# Patient Record
Sex: FEMALE | Race: WHITE | NOT HISPANIC OR LATINO | Employment: FULL TIME | URBAN - METROPOLITAN AREA
[De-identification: names, ages, dates, MRNs, and addresses within clinical notes are randomized per-mention and may not be internally consistent; named-entity substitution may affect disease eponyms.]

---

## 2022-08-23 ENCOUNTER — OFFICE VISIT (OUTPATIENT)
Dept: OBSTETRICS AND GYNECOLOGY | Facility: CLINIC | Age: 27
End: 2022-08-23
Payer: COMMERCIAL

## 2022-08-23 VITALS — DIASTOLIC BLOOD PRESSURE: 78 MMHG | BODY MASS INDEX: 21.14 KG/M2 | HEIGHT: 67 IN | SYSTOLIC BLOOD PRESSURE: 120 MMHG

## 2022-08-23 DIAGNOSIS — Z97.5 IUD (INTRAUTERINE DEVICE) IN PLACE: ICD-10-CM

## 2022-08-23 DIAGNOSIS — Z01.419 WELL WOMAN EXAM WITH ROUTINE GYNECOLOGICAL EXAM: Primary | ICD-10-CM

## 2022-08-23 PROCEDURE — 58301 REMOVAL OF IUD: ICD-10-PCS | Mod: S$GLB,,, | Performed by: STUDENT IN AN ORGANIZED HEALTH CARE EDUCATION/TRAINING PROGRAM

## 2022-08-23 PROCEDURE — 99999 PR PBB SHADOW E&M-EST. PATIENT-LVL II: ICD-10-PCS | Mod: PBBFAC,,, | Performed by: STUDENT IN AN ORGANIZED HEALTH CARE EDUCATION/TRAINING PROGRAM

## 2022-08-23 PROCEDURE — 99385 PREV VISIT NEW AGE 18-39: CPT | Mod: 25,S$GLB,, | Performed by: STUDENT IN AN ORGANIZED HEALTH CARE EDUCATION/TRAINING PROGRAM

## 2022-08-23 PROCEDURE — 99385 PR PREVENTIVE VISIT,NEW,18-39: ICD-10-PCS | Mod: 25,S$GLB,, | Performed by: STUDENT IN AN ORGANIZED HEALTH CARE EDUCATION/TRAINING PROGRAM

## 2022-08-23 PROCEDURE — 58301 REMOVE INTRAUTERINE DEVICE: CPT | Mod: S$GLB,,, | Performed by: STUDENT IN AN ORGANIZED HEALTH CARE EDUCATION/TRAINING PROGRAM

## 2022-08-23 PROCEDURE — 99999 PR PBB SHADOW E&M-EST. PATIENT-LVL II: CPT | Mod: PBBFAC,,, | Performed by: STUDENT IN AN ORGANIZED HEALTH CARE EDUCATION/TRAINING PROGRAM

## 2022-08-23 PROCEDURE — 88175 CYTOPATH C/V AUTO FLUID REDO: CPT | Performed by: STUDENT IN AN ORGANIZED HEALTH CARE EDUCATION/TRAINING PROGRAM

## 2022-08-23 NOTE — PROGRESS NOTES
History & Physical  Gynecology      SUBJECTIVE:     Chief Complaint: Well Woman and IUD removal (Pt refused weights)       History of Present Illness:  Annual exam and IUD removal. Desires fertility  Menstrual History: irregular menses in high school. IUD no menses  Obstetric Hx: 0  LMP: n/a  STD/STI Hx: Denies any history of STD's  Contraception Hx: IUD  Sexually Active: one male partner  Family history: below  Social: Wears seatbelts. Exercises somteimes. Feels safe at home.   Last pap: 4 years ago,normal  HPV vaccine: utd  Covid vaccine utd  Vitamins: yes      Review of patient's allergies indicates:  No Known Allergies    Past Medical History:   Diagnosis Date    Anxiety disorder, unspecified     Chronic GERD      Past Surgical History:   Procedure Laterality Date    ESOPHAGOGASTRODUODENOSCOPY N/A 8/12/2022    Procedure: EGD (ESOPHAGOGASTRODUODENOSCOPY);  Surgeon: Naun Gaitan MD;  Location: [facility];  Service: General;  Laterality: N/A;    tempanoplasty Bilateral      OB History    No obstetric history on file.       No family history on file.  Social History     Tobacco Use    Smoking status: Never Smoker    Smokeless tobacco: Never Used   Substance Use Topics    Alcohol use: Yes     Comment: social    Drug use: Never       Current Outpatient Medications   Medication Sig    FLUoxetine 40 MG capsule Take 40 mg by mouth once daily.    lansoprazole (PREVACID) 30 MG capsule Take 30 mg by mouth once daily.     No current facility-administered medications for this visit.         Review of Systems:  Review of Systems   Constitutional: Negative for activity change, appetite change, fever and unexpected weight change.   Respiratory: Negative for cough and shortness of breath.    Cardiovascular: Negative for chest pain.   Gastrointestinal: Negative for abdominal pain, blood in stool, constipation, diarrhea, nausea and vomiting.   Genitourinary: Negative for dysmenorrhea, dyspareunia, dysuria, hematuria,  menorrhagia, pelvic pain, vaginal bleeding, vaginal discharge, vaginal pain, postcoital bleeding and vaginal odor.   Integumentary:  Negative for breast mass.   Breast: Negative for lump and mass       OBJECTIVE:     Physical Exam:  Physical Exam  Vitals reviewed.   Constitutional:       General: She is not in acute distress.     Appearance: She is well-developed. She is not diaphoretic.   HENT:      Head: Normocephalic and atraumatic.   Eyes:      General: No scleral icterus.        Right eye: No discharge.         Left eye: No discharge.      Conjunctiva/sclera: Conjunctivae normal.   Neck:      Thyroid: No thyromegaly.   Cardiovascular:      Rate and Rhythm: Normal rate.   Pulmonary:      Effort: Pulmonary effort is normal.   Chest:   Breasts: Breasts are symmetrical.      Right: No inverted nipple, mass, nipple discharge, skin change or tenderness.      Left: No inverted nipple, mass, nipple discharge, skin change or tenderness.       Abdominal:      General: There is no distension.      Palpations: Abdomen is soft.      Tenderness: There is no abdominal tenderness.   Genitourinary:     Labia:         Right: No rash, tenderness, lesion or injury.         Left: No rash, tenderness, lesion or injury.       Vagina: Normal. No signs of injury and foreign body. No vaginal discharge, erythema, tenderness or bleeding.      Cervix: No cervical motion tenderness, discharge or friability.      Uterus: Not deviated, not enlarged, not fixed and not tender.       Adnexa:         Right: No mass, tenderness or fullness.          Left: No mass, tenderness or fullness.     Musculoskeletal:         General: Normal range of motion.      Cervical back: Normal range of motion and neck supple.   Lymphadenopathy:      Cervical: No cervical adenopathy.   Skin:     General: Skin is warm and dry.      Findings: No erythema or rash.   Neurological:      Mental Status: She is alert and oriented to person, place, and time.            ASSESSMENT:       ICD-10-CM ICD-9-CM    1. Well woman exam with routine gynecological exam  Z01.419 V72.31    2. IUD (intrauterine device) in place  Z97.5 V45.51           Plan:      WWE  - Vaccines utd  - Pap collected  - Mammogram age 39  - GC/CT, affirm n/a  - Daily vitamin discussed.  - Consistent condom use discussed.   - CBE normal. Physical exam normal. VSS.  - RTC for annual or PRN.    IUD in place  - IUD removed after consent obtained  - PNV recommended  - Reviewed cycle mapping  Counseling time: 15 minutes    Lily Pineda

## 2022-08-23 NOTE — PROCEDURES
Removal of IUD    Date/Time: 8/23/2022 1:30 PM  Performed by: Lily Pineda MD  Authorized by: Lily Pineda MD     Consent obtained:  Written  Consent given by:  Patient  Procedure risks and benefits discussed: yes    Patient questions answered: yes    Patient agrees, verbalizes understanding, and wants to proceed: yes    IUD grasped by: ring forceps  Removal due to infection and inflammatory reaction: no    Other reason for removal:  Desires fertility  Removal due to mechanical complications of IUD/Nexplanon: no    Removed with no complications: yes

## 2022-08-30 LAB
FINAL PATHOLOGIC DIAGNOSIS: NORMAL
Lab: NORMAL

## 2022-09-29 ENCOUNTER — PATIENT MESSAGE (OUTPATIENT)
Dept: OBSTETRICS AND GYNECOLOGY | Facility: CLINIC | Age: 27
End: 2022-09-29
Payer: COMMERCIAL

## 2022-11-17 ENCOUNTER — OFFICE VISIT (OUTPATIENT)
Dept: INTERNAL MEDICINE | Facility: CLINIC | Age: 27
End: 2022-11-17
Payer: COMMERCIAL

## 2022-11-17 VITALS
WEIGHT: 134.94 LBS | SYSTOLIC BLOOD PRESSURE: 104 MMHG | DIASTOLIC BLOOD PRESSURE: 68 MMHG | BODY MASS INDEX: 21.18 KG/M2 | HEART RATE: 72 BPM | HEIGHT: 67 IN

## 2022-11-17 DIAGNOSIS — K21.9 CHRONIC GERD: Primary | ICD-10-CM

## 2022-11-17 DIAGNOSIS — F50.9 EATING DISORDER, UNSPECIFIED TYPE: ICD-10-CM

## 2022-11-17 DIAGNOSIS — F41.9 ANXIETY: ICD-10-CM

## 2022-11-17 PROCEDURE — 99999 PR PBB SHADOW E&M-EST. PATIENT-LVL III: ICD-10-PCS | Mod: PBBFAC,,,

## 2022-11-17 PROCEDURE — 99999 PR PBB SHADOW E&M-EST. PATIENT-LVL III: CPT | Mod: PBBFAC,,,

## 2022-11-17 PROCEDURE — 99202 OFFICE O/P NEW SF 15 MIN: CPT | Mod: S$GLB,,,

## 2022-11-17 PROCEDURE — 99202 PR OFFICE/OUTPT VISIT, NEW, LEVL II, 15-29 MIN: ICD-10-PCS | Mod: S$GLB,,,

## 2022-11-17 RX ORDER — FLUOXETINE HYDROCHLORIDE 40 MG/1
40 CAPSULE ORAL DAILY
Qty: 30 CAPSULE | Refills: 11 | Status: SHIPPED | OUTPATIENT
Start: 2022-11-17 | End: 2023-10-20 | Stop reason: SDUPTHER

## 2022-11-17 RX ORDER — LANSOPRAZOLE 30 MG/1
30 CAPSULE, DELAYED RELEASE ORAL DAILY
Qty: 30 CAPSULE | Refills: 11 | Status: SHIPPED | OUTPATIENT
Start: 2022-11-17 | End: 2023-09-13 | Stop reason: SDUPTHER

## 2022-11-17 NOTE — PROGRESS NOTES
Internal Medicine Clinic Note    Subjective     Chief Complaint:  refill of medications    History of Present Illness:  Ms. Opal Killian is a 27 y.o. female with a history of anxiety/eating disorder disorder and chronic GERD who presents to clinic for refill of medications. She has no complaints today. 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     Has had chronic GERD since the age of 7 and has been taking lasoprazole for several years. She notes she does get some reflux symptoms with spicy foods or with drinking alcohol, but it has been under control. She had an EGD completed in August 2022 to screen for Sims's esophagus. Results showed normal esophagus with multiple gastric polyps. Polyp biopsy showed no evidence of dysplasia or malignancy. Negative for H. Pylori.       For her anxiety/eating disorder she follows with dietician - does blind weights with them. Sees them monthly. No issues with relapses and has been eating well.     She works as a surgery resident and would like to establish care with a formal annual exam later next year. We will defer health maintenance updates until that time.    Review of Systems   Constitutional:  Negative for chills, fever, malaise/fatigue and weight loss.   HENT:  Negative for congestion and sore throat.    Eyes:  Negative for blurred vision and pain.   Respiratory:  Negative for cough and shortness of breath.    Cardiovascular:  Negative for chest pain and palpitations.   Gastrointestinal:  Negative for abdominal pain, diarrhea, nausea and vomiting.   Genitourinary:  Negative for dysuria and hematuria.   Musculoskeletal:  Negative for falls, joint pain and myalgias.   Skin:  Negative for rash.   Neurological:  Negative for dizziness, weakness and headaches.   Psychiatric/Behavioral:  The patient is not nervous/anxious.      PAST HISTORY:     Past Medical History:   Diagnosis Date    Anxiety disorder, unspecified  "    Chronic GERD        Past Surgical History:   Procedure Laterality Date    ESOPHAGOGASTRODUODENOSCOPY N/A 8/12/2022    Procedure: EGD (ESOPHAGOGASTRODUODENOSCOPY);  Surgeon: Naun Gaitan MD;  Location: [facility];  Service: General;  Laterality: N/A;    tempanoplasty Bilateral        No family history on file.    Social History     Socioeconomic History    Marital status:    Tobacco Use    Smoking status: Never    Smokeless tobacco: Never   Substance and Sexual Activity    Alcohol use: Yes     Comment: social    Drug use: Never       MEDICATIONS & ALLERGIES:     Current Outpatient Medications on File Prior to Visit   Medication Sig    [DISCONTINUED] FLUoxetine 40 MG capsule Take 40 mg by mouth once daily.    [DISCONTINUED] lansoprazole (PREVACID) 30 MG capsule Take 30 mg by mouth once daily.     No current facility-administered medications on file prior to visit.       Review of patient's allergies indicates:  No Known Allergies    OBJECTIVE:     Vital Signs:  Vitals:    11/17/22 0807   BP: 104/68   BP Location: Left arm   Patient Position: Sitting   Pulse: 72       Height: 5' 7" (1.702 m)           Physical Exam  Vitals reviewed.   Constitutional:       General: She is not in acute distress.     Appearance: Normal appearance. She is not ill-appearing.   HENT:      Head: Normocephalic and atraumatic.   Cardiovascular:      Rate and Rhythm: Normal rate and regular rhythm.   Pulmonary:      Effort: Pulmonary effort is normal. No respiratory distress.      Breath sounds: Normal breath sounds.   Musculoskeletal:         General: Normal range of motion.   Skin:     General: Skin is warm and dry.   Neurological:      Mental Status: She is alert and oriented to person, place, and time. Mental status is at baseline.   Psychiatric:         Mood and Affect: Mood normal.         Behavior: Behavior normal.       Laboratory  No results found for: WBC, HGB, HCT, MCV, PLT  BMP  No results found for: NA, K, CL, CO2, BUN, " CREATININE, CALCIUM, ANIONGAP, EGFRNORACEVR  No results found for: INR, PROTIME  No results found for: HGBA1C    Diagnostic Results:  Labs: Reviewed  Upper GI: Reviewed      Health Maintenance Due   Topic Date Due    Hepatitis C Screening  Never done    Lipid Panel  Never done    HIV Screening  Never done    TETANUS VACCINE  Never done    COVID-19 Vaccine (3 - Booster for Moderna series) 04/03/2021    Influenza Vaccine (1) 09/01/2022         ASSESSMENT & PLAN:   Ms. Opal Killian is a 27 y.o. female with a history of chronic GERD and anxiety/eating disorder who presents to clinic for refill of longstanding medications. She has no concerns for worsening reflux symptoms and most recent EGD was normal. Her anxiety and eating disorder symptoms are under control and she has been following with a dietician monthly. She would like to defer annual labs/establishing care until a later time.        Chronic GERD  -     lansoprazole (PREVACID) 30 MG capsule; Take 1 capsule (30 mg total) by mouth once daily.  Dispense: 30 capsule; Refill: 11    Eating disorder, unspecified type  -     FLUoxetine 40 MG capsule; Take 1 capsule (40 mg total) by mouth once daily.  Dispense: 30 capsule; Refill: 11    Anxiety  -     FLUoxetine 40 MG capsule; Take 1 capsule (40 mg total) by mouth once daily.  Dispense: 30 capsule; Refill: 11      RTC in PRN    Discussed with Dr. Cadena  - staff attestation to follow        Taya Ayers MD, MPH  Internal Medicine, PGY-2  [facility]  [address] [place] [id number] [phone]

## 2022-11-17 NOTE — PROGRESS NOTES
Patient's history and physical exam discussed. Please refer to resident physician's note for specific details. I agree with resident's assessment and plan.     Flaco Cadena MD  Department of Internal Medicine  [facility]

## 2022-11-30 RX ORDER — NITROFURANTOIN 25; 75 MG/1; MG/1
100 CAPSULE ORAL 2 TIMES DAILY
Qty: 5 CAPSULE | Refills: 0 | Status: SHIPPED | OUTPATIENT
Start: 2022-11-30 | End: 2023-04-25

## 2023-02-03 RX ORDER — ONDANSETRON 4 MG/1
4 TABLET, ORALLY DISINTEGRATING ORAL 2 TIMES DAILY
Qty: 10 TABLET | Refills: 0 | Status: SHIPPED | OUTPATIENT
Start: 2023-02-03 | End: 2023-04-25

## 2023-02-08 ENCOUNTER — TELEPHONE (OUTPATIENT)
Dept: OBSTETRICS AND GYNECOLOGY | Facility: CLINIC | Age: 28
End: 2023-02-08
Payer: COMMERCIAL

## 2023-02-08 NOTE — TELEPHONE ENCOUNTER
----- Message from Amada Schulz MA sent at 2/7/2023  4:43 PM CST -----  Regarding: FW: Patient Advice    ----- Message -----  From: Cordelia Law  Sent: 2/7/2023   2:54 PM CST  To: [facility] Staff  Subject: Patient Advice                                                 Name of Who is Calling:  Opal Killian    Who Left The Message:  Opal Killian      What is the request in detail:       Patient called requesting a call regarding it has been at least 3 month and she still has not had a menstrual cycle. Patient also states she is not pregnant.  Please further advise.   Thank you      Reply by [facility]:  YES      Preferred Call Back :  [phone] (M)

## 2023-02-14 ENCOUNTER — PATIENT MESSAGE (OUTPATIENT)
Dept: OBSTETRICS AND GYNECOLOGY | Facility: CLINIC | Age: 28
End: 2023-02-14
Payer: COMMERCIAL

## 2023-02-14 DIAGNOSIS — N91.5 OLIGOMENORRHEA, UNSPECIFIED TYPE: Primary | ICD-10-CM

## 2023-02-15 ENCOUNTER — LAB VISIT (OUTPATIENT)
Dept: LAB | Facility: HOSPITAL | Age: 28
End: 2023-02-15
Attending: STUDENT IN AN ORGANIZED HEALTH CARE EDUCATION/TRAINING PROGRAM
Payer: COMMERCIAL

## 2023-02-15 DIAGNOSIS — N91.5 OLIGOMENORRHEA, UNSPECIFIED TYPE: ICD-10-CM

## 2023-02-15 LAB
ESTRADIOL SERPL-MCNC: 55 PG/ML
FSH SERPL-ACNC: 6.93 MIU/ML
HCG INTACT+B SERPL-ACNC: <2.4 MIU/ML
LH SERPL-ACNC: 9.6 MIU/ML
PROLACTIN SERPL IA-MCNC: 7.7 NG/ML (ref 5.2–26.5)
TSH SERPL DL<=0.005 MIU/L-ACNC: 2.7 UIU/ML (ref 0.4–4)

## 2023-02-15 PROCEDURE — 84146 ASSAY OF PROLACTIN: CPT | Performed by: STUDENT IN AN ORGANIZED HEALTH CARE EDUCATION/TRAINING PROGRAM

## 2023-02-15 PROCEDURE — 83001 ASSAY OF GONADOTROPIN (FSH): CPT | Performed by: STUDENT IN AN ORGANIZED HEALTH CARE EDUCATION/TRAINING PROGRAM

## 2023-02-15 PROCEDURE — 83520 IMMUNOASSAY QUANT NOS NONAB: CPT | Performed by: STUDENT IN AN ORGANIZED HEALTH CARE EDUCATION/TRAINING PROGRAM

## 2023-02-15 PROCEDURE — 84702 CHORIONIC GONADOTROPIN TEST: CPT | Performed by: STUDENT IN AN ORGANIZED HEALTH CARE EDUCATION/TRAINING PROGRAM

## 2023-02-15 PROCEDURE — 82670 ASSAY OF TOTAL ESTRADIOL: CPT | Performed by: STUDENT IN AN ORGANIZED HEALTH CARE EDUCATION/TRAINING PROGRAM

## 2023-02-15 PROCEDURE — 83002 ASSAY OF GONADOTROPIN (LH): CPT | Performed by: STUDENT IN AN ORGANIZED HEALTH CARE EDUCATION/TRAINING PROGRAM

## 2023-02-15 PROCEDURE — 84443 ASSAY THYROID STIM HORMONE: CPT | Performed by: STUDENT IN AN ORGANIZED HEALTH CARE EDUCATION/TRAINING PROGRAM

## 2023-02-16 ENCOUNTER — PATIENT MESSAGE (OUTPATIENT)
Dept: OBSTETRICS AND GYNECOLOGY | Facility: CLINIC | Age: 28
End: 2023-02-16
Payer: COMMERCIAL

## 2023-02-16 LAB — MIS SERPL-MCNC: 7 NG/ML (ref 0.89–9.9)

## 2023-02-20 ENCOUNTER — PATIENT MESSAGE (OUTPATIENT)
Dept: OBSTETRICS AND GYNECOLOGY | Facility: CLINIC | Age: 28
End: 2023-02-20
Payer: COMMERCIAL

## 2023-03-23 ENCOUNTER — CLINICAL SUPPORT (OUTPATIENT)
Dept: OBSTETRICS AND GYNECOLOGY | Facility: CLINIC | Age: 28
End: 2023-03-23
Payer: COMMERCIAL

## 2023-03-23 DIAGNOSIS — N91.2 AMENORRHEA: Primary | ICD-10-CM

## 2023-03-28 ENCOUNTER — HOSPITAL ENCOUNTER (OUTPATIENT)
Dept: PERINATAL CARE | Facility: OTHER | Age: 28
Discharge: HOME | End: 2023-03-28
Attending: STUDENT IN AN ORGANIZED HEALTH CARE EDUCATION/TRAINING PROGRAM
Payer: COMMERCIAL

## 2023-03-28 ENCOUNTER — OFFICE VISIT (OUTPATIENT)
Dept: OBSTETRICS AND GYNECOLOGY | Facility: CLINIC | Age: 28
End: 2023-03-28
Payer: COMMERCIAL

## 2023-03-28 VITALS
BODY MASS INDEX: 20.97 KG/M2 | SYSTOLIC BLOOD PRESSURE: 104 MMHG | DIASTOLIC BLOOD PRESSURE: 68 MMHG | HEIGHT: 67 IN | WEIGHT: 133.63 LBS

## 2023-03-28 DIAGNOSIS — Z34.90 PREGNANCY, UNSPECIFIED GESTATIONAL AGE: Primary | ICD-10-CM

## 2023-03-28 DIAGNOSIS — N91.2 AMENORRHEA: ICD-10-CM

## 2023-03-28 PROCEDURE — 87086 URINE CULTURE/COLONY COUNT: CPT | Performed by: ADVANCED PRACTICE MIDWIFE

## 2023-03-28 PROCEDURE — 76801 OB US < 14 WKS SINGLE FETUS: CPT | Mod: 26,,, | Performed by: OBSTETRICS & GYNECOLOGY

## 2023-03-28 PROCEDURE — 99999 PR PBB SHADOW E&M-EST. PATIENT-LVL III: CPT | Mod: PBBFAC,,, | Performed by: ADVANCED PRACTICE MIDWIFE

## 2023-03-28 PROCEDURE — 87591 N.GONORRHOEAE DNA AMP PROB: CPT | Performed by: ADVANCED PRACTICE MIDWIFE

## 2023-03-28 PROCEDURE — 99213 OFFICE O/P EST LOW 20 MIN: CPT | Mod: S$GLB,,, | Performed by: ADVANCED PRACTICE MIDWIFE

## 2023-03-28 PROCEDURE — 76801 US OB/GYN PROCEDURE (VIEWPOINT): ICD-10-PCS | Mod: 26,,, | Performed by: OBSTETRICS & GYNECOLOGY

## 2023-03-28 PROCEDURE — 99999 PR PBB SHADOW E&M-EST. PATIENT-LVL III: ICD-10-PCS | Mod: PBBFAC,,, | Performed by: ADVANCED PRACTICE MIDWIFE

## 2023-03-28 PROCEDURE — 99213 PR OFFICE/OUTPT VISIT, EST, LEVL III, 20-29 MIN: ICD-10-PCS | Mod: S$GLB,,, | Performed by: ADVANCED PRACTICE MIDWIFE

## 2023-03-28 PROCEDURE — 76801 OB US < 14 WKS SINGLE FETUS: CPT

## 2023-03-28 NOTE — PROGRESS NOTES
HISTORY OF PRESENT ILLNESS:    Opal Killian is a 28 y.o. female, ,  Patient's last menstrual period was 2023.  for a routine exam complaining of amenorrhea.    Pt reports nausea.     Past Medical History:   Diagnosis Date    Anxiety disorder, unspecified     Chronic GERD        Past Surgical History:   Procedure Laterality Date    ESOPHAGOGASTRODUODENOSCOPY N/A 2022    Procedure: EGD (ESOPHAGOGASTRODUODENOSCOPY);  Surgeon: Naun Gaitan MD;  [facility];  Service: General;  Laterality: N/A;    tempanoplasty Bilateral        MEDICATIONS AND ALLERGIES:      Current Outpatient Medications:     FLUoxetine 40 MG capsule, Take 1 capsule (40 mg total) by mouth once daily., Disp: 30 capsule, Rfl: 11    lansoprazole (PREVACID) 30 MG capsule, Take 1 capsule (30 mg total) by mouth once daily., Disp: 30 capsule, Rfl: 11    prenatal vit no.124/iron/folic (PRENATAL VITAMIN ORAL), , Disp: , Rfl:     nitrofurantoin, macrocrystal-monohydrate, (MACROBID) 100 MG capsule, Take 1 capsule (100 mg total) by mouth 2 (two) times daily., Disp: 5 capsule, Rfl: 0    ondansetron (ZOFRAN-ODT) 4 MG TbDL, Dissolve 1 tablet (4 mg total) on the tongue 2 (two) times daily., Disp: 10 tablet, Rfl: 0    Review of patient's allergies indicates:  No Known Allergies    History reviewed. No pertinent family history.    Social History     Socioeconomic History    Marital status:    Tobacco Use    Smoking status: Never    Smokeless tobacco: Never   Substance and Sexual Activity    Alcohol use: Not Currently     Comment: social    Drug use: Never    Sexual activity: Yes     Partners: Male     Birth control/protection: None       COMPREHENSIVE GYN HISTORY:  PAP History: Denies abnormal Paps.  Infection History: Denies STDs. Denies PID.  Benign History: Denies uterine fibroids. Denies ovarian cysts. Denies endometriosis. Denies other conditions.  Cancer History: Denies cervical cancer. Denies uterine cancer or  "hyperplasia. Denies ovarian cancer. Denies vulvar cancer or pre-cancer. Denies vaginal cancer or pre-cancer. Denies breast cancer. Denies colon cancer.  Sexual Activity History: Reports currently being sexually active  Menstrual History: None.  Contraception: None    ROS:  GENERAL: No weight changes. No swelling. No fatigue. No fever.  CARDIOVASCULAR: No chest pain. No shortness of breath. No leg cramps.   NEUROLOGICAL: No headaches. No vision changes.  BREASTS: No pain. No lumps. No discharge.  ABDOMEN: No pain. No diarrhea. No constipation.  REPRODUCTIVE: No abnormal bleeding.   VULVA: No pain. No lesions. No itching.  VAGINA: No relaxation. No itching. No odor. No discharge. No lesions.  URINARY: No incontinence. No nocturia. No frequency. No dysuria.    /68   Ht 5' 7" (1.702 m)   Wt 60.6 kg (133 lb 9.6 oz)   LMP 01/24/2023   BMI 20.92 kg/m²     PE:  AFFECT: Alert and oriented X 3. Interactive during exam  GENERAL: Appearance well-nourished, well-developed, in no acute distress.  HEENT: WNL  TEETH: Good dentition.  LUNGS: Easy and unlabored  HEART: Regular rate and rhythm   SKIN: No lesions or rashes.      PROCEDURES:  UPT Positive          DIAGNOSIS:  Gyn exam  IUP with stated LMP of 01/24/23, dating scan today with EGS 6wk2d, TERA 11/19/23    PLAN:Routine prenatal care    MEDICATIONS PRESCRIBED:   Currently taking OTC PNV    LABS AND TESTS ORDERED:  New Ob Labs      NEW PREGNANCY COUNSELING  Patient was counseled today on:  - Routine prenatal blood tests including HIV and anticipated course of prenatal care  - Prenatal vitamins and folic acid  - Weight gain, nutrition, and exercise  - Seafood and mercury  - Properly heating deli and prepared meats and avoiding unrefrigerated deli  meats, cheeses, and milk products,   - Avoiding cat litter and raw meats due to risk of Toxoplasmosis precautions   - Accuracy of the LMP-based TERA and the value of an early TV-u/s  - Aneuploidy and neural tube screening -- " cffDNA, sequential screening, and AFP screen at 15 weeks  - OTC medication in the first trimester  - Harmful effects of smoking, etOH, and recreational drugs  - M u/s  at 18-20 weeks.  - Common complaints of pregnancy  - Seat belt use  - Childbirth classes and hospital facilities  - All questions were answered    TERATOLOGY COUNSELING:   Discussed indications and options for aneuploidy screening - pamphlets given    -  Pt desires  and will discuss with Dr. Pineda at initial ob visit    -  Discussed Unisom and B6 for nausea    - Pain and bleeding precautions given    - Return to clinic in 4 weeks    Sarita Herrmann CNM    OB/GYN    Total time of 20 minutes, including face-to-face time and non-face-to-face time preparing to see the patient (eg, review of tests), obtaining and/or reviewing separately obtained history, documenting clinical information in the electronic or other health record, independently interpreting results, communicating results to the patient/family/caregiver, or care coordination.

## 2023-03-29 LAB — BACTERIA UR CULT: NO GROWTH

## 2023-03-30 LAB
C TRACH DNA SPEC QL NAA+PROBE: NOT DETECTED
N GONORRHOEA DNA SPEC QL NAA+PROBE: NOT DETECTED

## 2023-04-25 ENCOUNTER — INITIAL PRENATAL (OUTPATIENT)
Dept: OBSTETRICS AND GYNECOLOGY | Facility: CLINIC | Age: 28
End: 2023-04-25
Payer: COMMERCIAL

## 2023-04-25 ENCOUNTER — PATIENT MESSAGE (OUTPATIENT)
Dept: ADMINISTRATIVE | Facility: OTHER | Age: 28
End: 2023-04-25
Payer: COMMERCIAL

## 2023-04-25 VITALS — DIASTOLIC BLOOD PRESSURE: 64 MMHG | WEIGHT: 134.5 LBS | BODY MASS INDEX: 21.06 KG/M2 | SYSTOLIC BLOOD PRESSURE: 118 MMHG

## 2023-04-25 DIAGNOSIS — Z34.91 ENCOUNTER FOR SUPERVISION OF NORMAL PREGNANCY IN FIRST TRIMESTER, UNSPECIFIED GRAVIDITY: Primary | ICD-10-CM

## 2023-04-25 DIAGNOSIS — O26.899 RH NEGATIVE STATE IN ANTEPARTUM PERIOD: ICD-10-CM

## 2023-04-25 DIAGNOSIS — Z67.91 RH NEGATIVE STATE IN ANTEPARTUM PERIOD: ICD-10-CM

## 2023-04-25 PROCEDURE — 0500F INITIAL PRENATAL CARE VISIT: CPT | Mod: S$GLB,,, | Performed by: STUDENT IN AN ORGANIZED HEALTH CARE EDUCATION/TRAINING PROGRAM

## 2023-04-25 PROCEDURE — 0500F PR INITIAL PRENATAL CARE VISIT: ICD-10-PCS | Mod: S$GLB,,, | Performed by: STUDENT IN AN ORGANIZED HEALTH CARE EDUCATION/TRAINING PROGRAM

## 2023-04-25 PROCEDURE — 99999 PR PBB SHADOW E&M-EST. PATIENT-LVL II: CPT | Mod: PBBFAC,,, | Performed by: STUDENT IN AN ORGANIZED HEALTH CARE EDUCATION/TRAINING PROGRAM

## 2023-04-25 PROCEDURE — 99999 PR PBB SHADOW E&M-EST. PATIENT-LVL II: ICD-10-PCS | Mod: PBBFAC,,, | Performed by: STUDENT IN AN ORGANIZED HEALTH CARE EDUCATION/TRAINING PROGRAM

## 2023-04-25 NOTE — PATIENT INSTRUCTIONS
[email or web address]    Nausea and vomiting  Vitamin B6 (pyridoxine) 10-25mg orally with Doxylamine (unisom) 12.5mg orally every 6-8 hours as needed for nausea. If the fatigue is too bad, you can try just the B6. Lastly, if taking it as needed is not helping, consider taking the two together on a scheduled basis.    Medications  Avoid NSAIDs (aleve, motrin, ibuprofen)  Use Tylenol or Acetaminophen for aches/pains, headaches  Heating pad  GasX or simethicone for gas pains  Colace for constipation  Medications that are pregnancy category A, B or C are accepted as safe during pregnancy    Genetic Testing  LehwrypM56: best test we have, most sensitive. Tells gender. Call Toll Free to get survey to decrease cost [phone]. website has a video about the test [employer]/videos  Sequential Screen: second best test includes ultrasound around 11 weeks and blood tests at 11 and 15 weeks  Quad screen: least sensitive for chromosomal issues, but would tell us about neural tube defects (such as spina bifida), blood work after 15 weeks   (Sequential or Quad do not tell sex of baby)    Caffiene  Less than 200mg per day    Exercise  Listen to your body  Don't stay with heart rate >140bpm    Weight Gain  -- Recommended weight gain of:          Underweight        Less than 18.5            28-40            Normal Weight     18.5-24.9                    25-35            Overweight          25-29.9                       15-25            Obese                  30 and greater             11-20      Covid  [email or web address]://[email or web address]/womens-health/faqs/[email or web address]-covid-19-[email or web address]-breastfeeding    https://www.[facility].org/womens-health/experts-and-stories/the-latest/dnz-qg-ug-know-the-covid-19-vaccines-are-safe-and-effective-one-expert-explains    https://[email or web address]/coronavirus/covid-19-visitor-policy        Dear Opal Killian,    Congratulations! Your team here at [facility] is excited for the  upcoming addition to your family and is ready to support you over the course of your pregnancy. One of the ways we are prepared to help you is through our Connected MOM program.     Connected MOM is offered at no charge to help you manage your pregnancy by staying connected with your OB team through digitally connected devices. With Connected MOM, you will be able to digitally send weights and blood pressure readings to your provider and their team from the comfort of work or home without needing to schedule an appointment. Patients who participate in Connected MOM may be able to reduce the number of in-office appointments needed.     To participate, click here to complete the Connected Mom Program Consent questionnaire to start the enrollment process.    Once youve completed the questionnaire, you will be able to select how youd like to obtain your Connected Mom equipment - a digital blood pressure cuff and scale. These can be shipped directly to your home or picked up at an [facility].       If you have any questions about the program, please contact your OB provider or the Connected MOM support team at [phone].    Thank you,  The Connected MOM Team

## 2023-04-25 NOTE — PROGRESS NOTES
Pt is here for initial OB. Feeling well today  Feeling fatigued, nauseated. B6 unsure if helping. Also has breast tenderness. No bleeding or pain.  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 Safe at home. Cat, does not change litter box.   Taking PNV   PMH: pantoprazole and fluoxetine, allergy  Prior pregnancies none  PSH: no abdominal  Desires Mirena for contraception  Wants to breastfeed  No family history of genetic disorders  No personal or family history of DM  No tobacco, EtOH or illicit drug use  Pap utd  No H/o HSV  Covid vaccinated           -Reviewed routine PNC  -Reviewed newmom, connected mom  -Reviewed initial labs, ultrasound  -Reviewed common pregnancy complaints and comfort measures for them  -Genetic testing : will get back to me on her decision  -RTC 4 weeks    Lily Pineda M.D.

## 2023-05-03 ENCOUNTER — PATIENT MESSAGE (OUTPATIENT)
Dept: OBSTETRICS AND GYNECOLOGY | Facility: CLINIC | Age: 28
End: 2023-05-03
Payer: COMMERCIAL

## 2023-05-08 ENCOUNTER — PATIENT MESSAGE (OUTPATIENT)
Dept: OBSTETRICS AND GYNECOLOGY | Facility: CLINIC | Age: 28
End: 2023-05-08
Payer: COMMERCIAL

## 2023-05-10 ENCOUNTER — PATIENT MESSAGE (OUTPATIENT)
Dept: OBSTETRICS AND GYNECOLOGY | Facility: CLINIC | Age: 28
End: 2023-05-10
Payer: COMMERCIAL

## 2023-05-11 ENCOUNTER — PATIENT MESSAGE (OUTPATIENT)
Dept: OBSTETRICS AND GYNECOLOGY | Facility: CLINIC | Age: 28
End: 2023-05-11
Payer: COMMERCIAL

## 2023-05-11 RX ORDER — ONDANSETRON 4 MG/1
8 TABLET, ORALLY DISINTEGRATING ORAL 2 TIMES DAILY
Qty: 30 TABLET | Refills: 1 | Status: ON HOLD | OUTPATIENT
Start: 2023-05-11 | End: 2023-11-14 | Stop reason: HOSPADM

## 2023-05-16 ENCOUNTER — PATIENT MESSAGE (OUTPATIENT)
Dept: OBSTETRICS AND GYNECOLOGY | Facility: CLINIC | Age: 28
End: 2023-05-16
Payer: COMMERCIAL

## 2023-05-29 ENCOUNTER — TELEPHONE (OUTPATIENT)
Dept: OBSTETRICS AND GYNECOLOGY | Facility: CLINIC | Age: 28
End: 2023-05-29
Payer: COMMERCIAL

## 2023-05-31 ENCOUNTER — TELEPHONE (OUTPATIENT)
Dept: OBSTETRICS AND GYNECOLOGY | Facility: CLINIC | Age: 28
End: 2023-05-31
Payer: COMMERCIAL

## 2023-05-31 NOTE — TELEPHONE ENCOUNTER
Pt returned call and she stated that she is not having any other symptoms with her headaches and that her blood pressure has been fine. Let her know that pre provider she is able to take a daily supplement for magnesium oxide 400mg. Also let patient know that is she has any other changes to go on let us know.

## 2023-05-31 NOTE — TELEPHONE ENCOUNTER
----- Message from Erika Underwood sent at 5/31/2023  8:15 AM CDT -----  Pt called in to speak with the provider in regards to an ongoing headache she has been suffering with for 3 days. The pt states she has tried tylenol and caffeine and nothing is working. Pls call and advise.

## 2023-06-04 ENCOUNTER — PATIENT MESSAGE (OUTPATIENT)
Dept: OTHER | Facility: OTHER | Age: 28
End: 2023-06-04
Payer: COMMERCIAL

## 2023-06-07 ENCOUNTER — ROUTINE PRENATAL (OUTPATIENT)
Dept: OBSTETRICS AND GYNECOLOGY | Facility: CLINIC | Age: 28
End: 2023-06-07
Payer: COMMERCIAL

## 2023-06-07 ENCOUNTER — LAB VISIT (OUTPATIENT)
Dept: LAB | Facility: OTHER | Age: 28
End: 2023-06-07
Attending: NURSE PRACTITIONER
Payer: COMMERCIAL

## 2023-06-07 VITALS
WEIGHT: 136.69 LBS | DIASTOLIC BLOOD PRESSURE: 65 MMHG | BODY MASS INDEX: 21.41 KG/M2 | SYSTOLIC BLOOD PRESSURE: 113 MMHG

## 2023-06-07 DIAGNOSIS — Z3A.16 16 WEEKS GESTATION OF PREGNANCY: Primary | ICD-10-CM

## 2023-06-07 DIAGNOSIS — Z3A.16 16 WEEKS GESTATION OF PREGNANCY: ICD-10-CM

## 2023-06-07 PROCEDURE — 99999 PR PBB SHADOW E&M-EST. PATIENT-LVL III: CPT | Mod: PBBFAC,,, | Performed by: NURSE PRACTITIONER

## 2023-06-07 PROCEDURE — 99999 PR PBB SHADOW E&M-EST. PATIENT-LVL III: ICD-10-PCS | Mod: PBBFAC,,, | Performed by: NURSE PRACTITIONER

## 2023-06-07 PROCEDURE — 82105 ALPHA-FETOPROTEIN SERUM: CPT | Performed by: NURSE PRACTITIONER

## 2023-06-07 PROCEDURE — 0502F SUBSEQUENT PRENATAL CARE: CPT | Mod: S$GLB,,, | Performed by: NURSE PRACTITIONER

## 2023-06-07 PROCEDURE — 0502F PR SUBSEQUENT PRENATAL CARE: ICD-10-PCS | Mod: S$GLB,,, | Performed by: NURSE PRACTITIONER

## 2023-06-07 PROCEDURE — 36415 COLL VENOUS BLD VENIPUNCTURE: CPT | Performed by: NURSE PRACTITIONER

## 2023-06-07 RX ORDER — BUTALBITAL, ACETAMINOPHEN AND CAFFEINE 50; 325; 40 MG/1; MG/1; MG/1
1 TABLET ORAL EVERY 12 HOURS PRN
Qty: 20 TABLET | Refills: 0 | Status: SHIPPED | OUTPATIENT
Start: 2023-06-07 | End: 2023-07-07

## 2023-06-07 NOTE — PROGRESS NOTES
Here for routine OB appt at 16w3d, with no complaints. No flutters yet. Getting some headaches, tylenol and caffeine do not help. Did mag oxide for a few days. Resident at main campus, works long hours. Baby is going to [facility] .  Denies VB and cramping.  Pain, bleeding, and PTL precautions given.  F/U scheduled 8 weeks- skips 20 week visit  Anatomy scan ordered  [facility] MOM uploads reviewed  Rhogam at 28 weeks with glucose  AFP marker today  Rx Fioricet

## 2023-06-07 NOTE — PATIENT INSTRUCTIONS
LABOR AND DELIVERY PHONE NUMBER, [phone] (OPEN 24/7, LOCATED ON 6TH FLOOR OF HOSPITAL)  [address] PHONE NUMBER, [phone] (OPEN MON-FRI, 8a-5p)     Start Magnesium sulfate 500 mg daily as ppx for headaches. Increase to 1000 mg daily as needed / tolerated;              - Start Riboflavin 400 mg daily and CoQ10 200 mg daily as headaches ppx as well;              - She can use Fioricet safely as rescue medication so long as she restricts its use to 2 tablets per day and not more than 4 per week. We discussed that this restriction will help to prevent analgesics overuse headache syndrome and will also be low enough of a dose that her fetus will not develop dependence.

## 2023-06-09 ENCOUNTER — PATIENT MESSAGE (OUTPATIENT)
Dept: MATERNAL FETAL MEDICINE | Facility: CLINIC | Age: 28
End: 2023-06-09
Payer: COMMERCIAL

## 2023-06-11 ENCOUNTER — PATIENT MESSAGE (OUTPATIENT)
Dept: OTHER | Facility: OTHER | Age: 28
End: 2023-06-11
Payer: COMMERCIAL

## 2023-06-12 LAB
# FETUSES US: NORMAL
AFP INTERPRETATION: NORMAL
AFP MOM SERPL: 1.43
AFP SERPL-MCNC: 55.3 NG/ML
AFP SERPL-MCNC: NEGATIVE NG/ML
AGE AT DELIVERY: 29
GA (DAYS): 3 D
GA (WEEKS): 16 WK
GESTATIONAL AGE METHOD: NORMAL
IDDM PATIENT QL: NORMAL
SMOKING STATUS FTND: NO

## 2023-07-02 ENCOUNTER — PATIENT MESSAGE (OUTPATIENT)
Dept: OTHER | Facility: OTHER | Age: 28
End: 2023-07-02
Payer: COMMERCIAL

## 2023-07-03 ENCOUNTER — PATIENT MESSAGE (OUTPATIENT)
Dept: MATERNAL FETAL MEDICINE | Facility: CLINIC | Age: 28
End: 2023-07-03
Payer: COMMERCIAL

## 2023-07-05 ENCOUNTER — PROCEDURE VISIT (OUTPATIENT)
Dept: MATERNAL FETAL MEDICINE | Facility: CLINIC | Age: 28
End: 2023-07-05
Payer: COMMERCIAL

## 2023-07-05 DIAGNOSIS — Z3A.16 16 WEEKS GESTATION OF PREGNANCY: ICD-10-CM

## 2023-07-05 DIAGNOSIS — Z36.2 ENCOUNTER FOR FOLLOW-UP ULTRASOUND OF FETAL ANATOMY: Primary | ICD-10-CM

## 2023-07-05 PROCEDURE — 76805 OB US >/= 14 WKS SNGL FETUS: CPT | Mod: S$GLB,,, | Performed by: OBSTETRICS & GYNECOLOGY

## 2023-07-05 PROCEDURE — 76805 US MFM PROCEDURE (VIEWPOINT): ICD-10-PCS | Mod: S$GLB,,, | Performed by: OBSTETRICS & GYNECOLOGY

## 2023-07-30 ENCOUNTER — PATIENT MESSAGE (OUTPATIENT)
Dept: OTHER | Facility: OTHER | Age: 28
End: 2023-07-30
Payer: COMMERCIAL

## 2023-08-01 ENCOUNTER — ROUTINE PRENATAL (OUTPATIENT)
Dept: OBSTETRICS AND GYNECOLOGY | Facility: CLINIC | Age: 28
End: 2023-08-01
Payer: COMMERCIAL

## 2023-08-01 VITALS — WEIGHT: 147.5 LBS | SYSTOLIC BLOOD PRESSURE: 106 MMHG | DIASTOLIC BLOOD PRESSURE: 54 MMHG | BODY MASS INDEX: 23.1 KG/M2

## 2023-08-01 DIAGNOSIS — Z67.91 RH NEGATIVE STATE IN ANTEPARTUM PERIOD: Primary | ICD-10-CM

## 2023-08-01 DIAGNOSIS — O26.899 RH NEGATIVE STATE IN ANTEPARTUM PERIOD: Primary | ICD-10-CM

## 2023-08-01 DIAGNOSIS — Z34.91 ENCOUNTER FOR SUPERVISION OF NORMAL PREGNANCY IN FIRST TRIMESTER, UNSPECIFIED GRAVIDITY: ICD-10-CM

## 2023-08-01 DIAGNOSIS — Z34.91 ENCOUNTER FOR SUPERVISION OF NORMAL PREGNANCY IN FIRST TRIMESTER, UNSPECIFIED GRAVIDITY: Primary | ICD-10-CM

## 2023-08-01 DIAGNOSIS — O26.899 RH NEGATIVE STATE IN ANTEPARTUM PERIOD: ICD-10-CM

## 2023-08-01 DIAGNOSIS — Z67.91 RH NEGATIVE STATE IN ANTEPARTUM PERIOD: ICD-10-CM

## 2023-08-01 PROCEDURE — 0502F SUBSEQUENT PRENATAL CARE: CPT | Mod: S$GLB,,, | Performed by: STUDENT IN AN ORGANIZED HEALTH CARE EDUCATION/TRAINING PROGRAM

## 2023-08-01 PROCEDURE — 99999 PR PBB SHADOW E&M-EST. PATIENT-LVL III: CPT | Mod: PBBFAC,,, | Performed by: STUDENT IN AN ORGANIZED HEALTH CARE EDUCATION/TRAINING PROGRAM

## 2023-08-01 PROCEDURE — 0502F PR SUBSEQUENT PRENATAL CARE: ICD-10-PCS | Mod: S$GLB,,, | Performed by: STUDENT IN AN ORGANIZED HEALTH CARE EDUCATION/TRAINING PROGRAM

## 2023-08-01 PROCEDURE — 99999 PR PBB SHADOW E&M-EST. PATIENT-LVL III: ICD-10-PCS | Mod: PBBFAC,,, | Performed by: STUDENT IN AN ORGANIZED HEALTH CARE EDUCATION/TRAINING PROGRAM

## 2023-08-01 NOTE — PROGRESS NOTES
Pt doing well. No complaints. Some concern over weight gain  Denies vaginal bleeding, LOF, contractions. Reports regular fetal movement. Denies symptoms of pre E.  VS reviewed.  Glucola recs reviewed  Tdap and rhogam recs reviewed   Pump rx   Peds list given    Labor and pre E precautions reviewed.   RTC: 4 weeks

## 2023-08-01 NOTE — PATIENT INSTRUCTIONS
[employer]    OR    Bring the paper breast pump prescription to this location:  [email or web address]  [address] [place] [address]  [phone]          Tdap or Dtap for close family if not had in the past five years      [facility], Labor and Delivery is on the 6th floor of [facility]: [phone]      [email or web address]

## 2023-08-09 ENCOUNTER — PROCEDURE VISIT (OUTPATIENT)
Dept: MATERNAL FETAL MEDICINE | Facility: CLINIC | Age: 28
End: 2023-08-09
Payer: COMMERCIAL

## 2023-08-09 DIAGNOSIS — Z36.2 ENCOUNTER FOR FOLLOW-UP ULTRASOUND OF FETAL ANATOMY: ICD-10-CM

## 2023-08-09 PROCEDURE — 76816 OB US FOLLOW-UP PER FETUS: CPT | Mod: S$GLB,,, | Performed by: OBSTETRICS & GYNECOLOGY

## 2023-08-09 PROCEDURE — 76816 US MFM PROCEDURE (VIEWPOINT): ICD-10-PCS | Mod: S$GLB,,, | Performed by: OBSTETRICS & GYNECOLOGY

## 2023-08-13 ENCOUNTER — PATIENT MESSAGE (OUTPATIENT)
Dept: OTHER | Facility: OTHER | Age: 28
End: 2023-08-13
Payer: COMMERCIAL

## 2023-08-24 ENCOUNTER — TELEPHONE (OUTPATIENT)
Dept: OBSTETRICS AND GYNECOLOGY | Facility: CLINIC | Age: 28
End: 2023-08-24
Payer: COMMERCIAL

## 2023-08-24 NOTE — TELEPHONE ENCOUNTER
----- Message from Nuris Kc sent at 8/24/2023  8:34 AM CDT -----  Contact: JOSÉ STALEY [[id number]]  Type:  Patient Returning Call      Who Called:   JOSÉ STALEY [[id number]]      Who Left Message for Patient: Unsure      Does the patient know what this is regarding?: Yes      Would the patient rather a call back or a response via [facility]?  Call back       Best Call Back Number: [phone] (home)       Additional Information:

## 2023-08-27 ENCOUNTER — PATIENT MESSAGE (OUTPATIENT)
Dept: OTHER | Facility: OTHER | Age: 28
End: 2023-08-27
Payer: COMMERCIAL

## 2023-08-29 ENCOUNTER — LAB VISIT (OUTPATIENT)
Dept: LAB | Facility: OTHER | Age: 28
End: 2023-08-29
Attending: STUDENT IN AN ORGANIZED HEALTH CARE EDUCATION/TRAINING PROGRAM
Payer: COMMERCIAL

## 2023-08-29 ENCOUNTER — ROUTINE PRENATAL (OUTPATIENT)
Dept: OBSTETRICS AND GYNECOLOGY | Facility: CLINIC | Age: 28
End: 2023-08-29
Payer: COMMERCIAL

## 2023-08-29 VITALS — SYSTOLIC BLOOD PRESSURE: 112 MMHG | BODY MASS INDEX: 23.2 KG/M2 | WEIGHT: 148.13 LBS | DIASTOLIC BLOOD PRESSURE: 60 MMHG

## 2023-08-29 DIAGNOSIS — Z34.91 ENCOUNTER FOR SUPERVISION OF NORMAL PREGNANCY IN FIRST TRIMESTER, UNSPECIFIED GRAVIDITY: Primary | ICD-10-CM

## 2023-08-29 DIAGNOSIS — Z67.91 RH NEGATIVE STATE IN ANTEPARTUM PERIOD: ICD-10-CM

## 2023-08-29 DIAGNOSIS — O26.899 RH NEGATIVE STATE IN ANTEPARTUM PERIOD: ICD-10-CM

## 2023-08-29 DIAGNOSIS — Z34.91 ENCOUNTER FOR SUPERVISION OF NORMAL PREGNANCY IN FIRST TRIMESTER, UNSPECIFIED GRAVIDITY: ICD-10-CM

## 2023-08-29 LAB
BASOPHILS # BLD AUTO: 0.06 K/UL (ref 0–0.2)
BASOPHILS # BLD AUTO: 0.06 K/UL (ref 0–0.2)
BASOPHILS NFR BLD: 0.5 % (ref 0–1.9)
BASOPHILS NFR BLD: 0.5 % (ref 0–1.9)
DIFFERENTIAL METHOD: ABNORMAL
DIFFERENTIAL METHOD: ABNORMAL
EOSINOPHIL # BLD AUTO: 0.1 K/UL (ref 0–0.5)
EOSINOPHIL # BLD AUTO: 0.1 K/UL (ref 0–0.5)
EOSINOPHIL NFR BLD: 0.9 % (ref 0–8)
EOSINOPHIL NFR BLD: 0.9 % (ref 0–8)
ERYTHROCYTE [DISTWIDTH] IN BLOOD BY AUTOMATED COUNT: 12.2 % (ref 11.5–14.5)
ERYTHROCYTE [DISTWIDTH] IN BLOOD BY AUTOMATED COUNT: 12.2 % (ref 11.5–14.5)
GLUCOSE SERPL-MCNC: 148 MG/DL (ref 70–140)
HCT VFR BLD AUTO: 35.1 % (ref 37–48.5)
HCT VFR BLD AUTO: 35.1 % (ref 37–48.5)
HGB BLD-MCNC: 11.7 G/DL (ref 12–16)
HGB BLD-MCNC: 11.7 G/DL (ref 12–16)
IMM GRANULOCYTES # BLD AUTO: 0.1 K/UL (ref 0–0.04)
IMM GRANULOCYTES # BLD AUTO: 0.1 K/UL (ref 0–0.04)
IMM GRANULOCYTES NFR BLD AUTO: 0.8 % (ref 0–0.5)
IMM GRANULOCYTES NFR BLD AUTO: 0.8 % (ref 0–0.5)
LYMPHOCYTES # BLD AUTO: 2 K/UL (ref 1–4.8)
LYMPHOCYTES # BLD AUTO: 2 K/UL (ref 1–4.8)
LYMPHOCYTES NFR BLD: 16.2 % (ref 18–48)
LYMPHOCYTES NFR BLD: 16.2 % (ref 18–48)
MCH RBC QN AUTO: 30.2 PG (ref 27–31)
MCH RBC QN AUTO: 30.2 PG (ref 27–31)
MCHC RBC AUTO-ENTMCNC: 33.3 G/DL (ref 32–36)
MCHC RBC AUTO-ENTMCNC: 33.3 G/DL (ref 32–36)
MCV RBC AUTO: 91 FL (ref 82–98)
MCV RBC AUTO: 91 FL (ref 82–98)
MONOCYTES # BLD AUTO: 0.6 K/UL (ref 0.3–1)
MONOCYTES # BLD AUTO: 0.6 K/UL (ref 0.3–1)
MONOCYTES NFR BLD: 5.1 % (ref 4–15)
MONOCYTES NFR BLD: 5.1 % (ref 4–15)
NEUTROPHILS # BLD AUTO: 9.5 K/UL (ref 1.8–7.7)
NEUTROPHILS # BLD AUTO: 9.5 K/UL (ref 1.8–7.7)
NEUTROPHILS NFR BLD: 76.5 % (ref 38–73)
NEUTROPHILS NFR BLD: 76.5 % (ref 38–73)
NRBC BLD-RTO: 0 /100 WBC
NRBC BLD-RTO: 0 /100 WBC
PLATELET # BLD AUTO: 193 K/UL (ref 150–450)
PLATELET # BLD AUTO: 193 K/UL (ref 150–450)
PMV BLD AUTO: 10.4 FL (ref 9.2–12.9)
PMV BLD AUTO: 10.4 FL (ref 9.2–12.9)
RBC # BLD AUTO: 3.87 M/UL (ref 4–5.4)
RBC # BLD AUTO: 3.87 M/UL (ref 4–5.4)
RPR SER QL: NORMAL
WBC # BLD AUTO: 12.39 K/UL (ref 3.9–12.7)
WBC # BLD AUTO: 12.39 K/UL (ref 3.9–12.7)

## 2023-08-29 PROCEDURE — 87389 HIV-1 AG W/HIV-1&-2 AB AG IA: CPT | Performed by: STUDENT IN AN ORGANIZED HEALTH CARE EDUCATION/TRAINING PROGRAM

## 2023-08-29 PROCEDURE — 85025 COMPLETE CBC W/AUTO DIFF WBC: CPT | Performed by: STUDENT IN AN ORGANIZED HEALTH CARE EDUCATION/TRAINING PROGRAM

## 2023-08-29 PROCEDURE — 86592 SYPHILIS TEST NON-TREP QUAL: CPT | Performed by: STUDENT IN AN ORGANIZED HEALTH CARE EDUCATION/TRAINING PROGRAM

## 2023-08-29 PROCEDURE — 82950 GLUCOSE TEST: CPT | Performed by: STUDENT IN AN ORGANIZED HEALTH CARE EDUCATION/TRAINING PROGRAM

## 2023-08-29 PROCEDURE — 99999 PR PBB SHADOW E&M-EST. PATIENT-LVL III: ICD-10-PCS | Mod: PBBFAC,,, | Performed by: STUDENT IN AN ORGANIZED HEALTH CARE EDUCATION/TRAINING PROGRAM

## 2023-08-29 PROCEDURE — 36415 COLL VENOUS BLD VENIPUNCTURE: CPT | Performed by: STUDENT IN AN ORGANIZED HEALTH CARE EDUCATION/TRAINING PROGRAM

## 2023-08-29 PROCEDURE — 0502F SUBSEQUENT PRENATAL CARE: CPT | Mod: S$GLB,,, | Performed by: STUDENT IN AN ORGANIZED HEALTH CARE EDUCATION/TRAINING PROGRAM

## 2023-08-29 PROCEDURE — 0502F PR SUBSEQUENT PRENATAL CARE: ICD-10-PCS | Mod: S$GLB,,, | Performed by: STUDENT IN AN ORGANIZED HEALTH CARE EDUCATION/TRAINING PROGRAM

## 2023-08-29 PROCEDURE — 99999 PR PBB SHADOW E&M-EST. PATIENT-LVL III: CPT | Mod: PBBFAC,,, | Performed by: STUDENT IN AN ORGANIZED HEALTH CARE EDUCATION/TRAINING PROGRAM

## 2023-08-29 NOTE — PROGRESS NOTES
Pt doing well. GERD. Prilosec.   Denies vaginal bleeding, LOF, contractions. Reports regular fetal movement. Denies symptoms of pre E.  VS reviewed.  Glucola underway  Tdap and rhogam recs reviewed   Pump rx reviewed  Peds [place] at [facility]  Labor and pre E precautions reviewed.   RTC: 4 weeks

## 2023-08-29 NOTE — PATIENT INSTRUCTIONS
[employer]    OR    Bring the paper breast pump prescription to this location:  [email or web address]  [address] [place] [address]  [phone]          Tdap or Dtap for close family if not had in the past five years      [facility], Labor and Delivery is on the 6th floor of [facility]: [phone]      [email or web address]

## 2023-08-30 ENCOUNTER — TELEPHONE (OUTPATIENT)
Dept: OBSTETRICS AND GYNECOLOGY | Facility: CLINIC | Age: 28
End: 2023-08-30
Payer: COMMERCIAL

## 2023-08-30 LAB — HIV 1+2 AB+HIV1 P24 AG SERPL QL IA: NORMAL

## 2023-09-08 ENCOUNTER — LAB VISIT (OUTPATIENT)
Dept: LAB | Facility: OTHER | Age: 28
End: 2023-09-08
Attending: STUDENT IN AN ORGANIZED HEALTH CARE EDUCATION/TRAINING PROGRAM
Payer: COMMERCIAL

## 2023-09-08 DIAGNOSIS — Z34.91 ENCOUNTER FOR SUPERVISION OF NORMAL PREGNANCY IN FIRST TRIMESTER, UNSPECIFIED GRAVIDITY: ICD-10-CM

## 2023-09-08 DIAGNOSIS — O26.899 RH NEGATIVE STATE IN ANTEPARTUM PERIOD: ICD-10-CM

## 2023-09-08 DIAGNOSIS — Z67.91 RH NEGATIVE STATE IN ANTEPARTUM PERIOD: ICD-10-CM

## 2023-09-08 LAB
GLUCOSE SERPL-MCNC: 115 MG/DL
GLUCOSE SERPL-MCNC: 130 MG/DL
GLUCOSE SERPL-MCNC: 81 MG/DL (ref 70–110)
GLUCOSE SERPL-MCNC: 87 MG/DL

## 2023-09-08 PROCEDURE — 82951 GLUCOSE TOLERANCE TEST (GTT): CPT | Performed by: STUDENT IN AN ORGANIZED HEALTH CARE EDUCATION/TRAINING PROGRAM

## 2023-09-08 PROCEDURE — 82952 GTT-ADDED SAMPLES: CPT | Performed by: STUDENT IN AN ORGANIZED HEALTH CARE EDUCATION/TRAINING PROGRAM

## 2023-09-08 PROCEDURE — 36415 COLL VENOUS BLD VENIPUNCTURE: CPT | Performed by: STUDENT IN AN ORGANIZED HEALTH CARE EDUCATION/TRAINING PROGRAM

## 2023-09-10 ENCOUNTER — PATIENT MESSAGE (OUTPATIENT)
Dept: OTHER | Facility: OTHER | Age: 28
End: 2023-09-10
Payer: COMMERCIAL

## 2023-09-12 ENCOUNTER — ROUTINE PRENATAL (OUTPATIENT)
Dept: OBSTETRICS AND GYNECOLOGY | Facility: CLINIC | Age: 28
End: 2023-09-12
Payer: COMMERCIAL

## 2023-09-12 ENCOUNTER — CLINICAL SUPPORT (OUTPATIENT)
Dept: OBSTETRICS AND GYNECOLOGY | Facility: CLINIC | Age: 28
End: 2023-09-12
Payer: COMMERCIAL

## 2023-09-12 VITALS — BODY MASS INDEX: 23.48 KG/M2 | DIASTOLIC BLOOD PRESSURE: 56 MMHG | WEIGHT: 149.94 LBS | SYSTOLIC BLOOD PRESSURE: 96 MMHG

## 2023-09-12 DIAGNOSIS — O26.899 RH NEGATIVE STATE IN ANTEPARTUM PERIOD: Primary | ICD-10-CM

## 2023-09-12 DIAGNOSIS — Z67.91 RH NEGATIVE STATE IN ANTEPARTUM PERIOD: Primary | ICD-10-CM

## 2023-09-12 DIAGNOSIS — Z3A.16 16 WEEKS GESTATION OF PREGNANCY: ICD-10-CM

## 2023-09-12 DIAGNOSIS — Z34.91 ENCOUNTER FOR SUPERVISION OF NORMAL PREGNANCY IN FIRST TRIMESTER, UNSPECIFIED GRAVIDITY: ICD-10-CM

## 2023-09-12 PROCEDURE — 90471 TDAP VACCINE GREATER THAN OR EQUAL TO 7YO IM: ICD-10-PCS | Mod: S$GLB,,, | Performed by: STUDENT IN AN ORGANIZED HEALTH CARE EDUCATION/TRAINING PROGRAM

## 2023-09-12 PROCEDURE — 90715 TDAP VACCINE 7 YRS/> IM: CPT | Mod: S$GLB,,, | Performed by: STUDENT IN AN ORGANIZED HEALTH CARE EDUCATION/TRAINING PROGRAM

## 2023-09-12 PROCEDURE — 99999 PR PBB SHADOW E&M-EST. PATIENT-LVL I: ICD-10-PCS | Mod: PBBFAC,,,

## 2023-09-12 PROCEDURE — 96372 THER/PROPH/DIAG INJ SC/IM: CPT | Mod: S$GLB,,, | Performed by: STUDENT IN AN ORGANIZED HEALTH CARE EDUCATION/TRAINING PROGRAM

## 2023-09-12 PROCEDURE — 99999 PR PBB SHADOW E&M-EST. PATIENT-LVL III: CPT | Mod: PBBFAC,,, | Performed by: STUDENT IN AN ORGANIZED HEALTH CARE EDUCATION/TRAINING PROGRAM

## 2023-09-12 PROCEDURE — 90715 TDAP VACCINE GREATER THAN OR EQUAL TO 7YO IM: ICD-10-PCS | Mod: S$GLB,,, | Performed by: STUDENT IN AN ORGANIZED HEALTH CARE EDUCATION/TRAINING PROGRAM

## 2023-09-12 PROCEDURE — 0502F SUBSEQUENT PRENATAL CARE: CPT | Mod: S$GLB,,, | Performed by: STUDENT IN AN ORGANIZED HEALTH CARE EDUCATION/TRAINING PROGRAM

## 2023-09-12 PROCEDURE — 96372 RHO (D) IMMUNE GLOBULIN: ICD-10-PCS | Mod: S$GLB,,, | Performed by: STUDENT IN AN ORGANIZED HEALTH CARE EDUCATION/TRAINING PROGRAM

## 2023-09-12 PROCEDURE — 0502F PR SUBSEQUENT PRENATAL CARE: ICD-10-PCS | Mod: S$GLB,,, | Performed by: STUDENT IN AN ORGANIZED HEALTH CARE EDUCATION/TRAINING PROGRAM

## 2023-09-12 PROCEDURE — 99999 PR PBB SHADOW E&M-EST. PATIENT-LVL III: ICD-10-PCS | Mod: PBBFAC,,, | Performed by: STUDENT IN AN ORGANIZED HEALTH CARE EDUCATION/TRAINING PROGRAM

## 2023-09-12 PROCEDURE — 90471 IMMUNIZATION ADMIN: CPT | Mod: S$GLB,,, | Performed by: STUDENT IN AN ORGANIZED HEALTH CARE EDUCATION/TRAINING PROGRAM

## 2023-09-12 PROCEDURE — 99999 PR PBB SHADOW E&M-EST. PATIENT-LVL I: CPT | Mod: PBBFAC,,,

## 2023-09-12 NOTE — PROGRESS NOTES
Here for Tdap injection. Patient without complaint of pain at this time, injection given. Tolerated well no pain noted post injection advised to wait in lobby 15 minutes and report any adverse reactions.      Site:LD    Here for rhogam injection, no complaints at this time verified patient is Rh negative. No complaints of pain prior to or post injection patient advised to wait 15 minutes before leaving and to report and adverse reactions.        Site:LB

## 2023-09-12 NOTE — PROGRESS NOTES
Pt doing well. No issues. Some growing pains, aches. Denies vaginal bleeding, LOF, contractions. Reports regular fetal movement. Denies symptoms of pre E.  VS reviewed.  tdap and rhogam today  Labor and pre E precautions reviewed.   RTC: 2 weeks

## 2023-09-12 NOTE — PATIENT INSTRUCTIONS
[facility], Labor and Delivery is on the 6th floor of [facility]: [phone]    [email or web address]

## 2023-09-13 DIAGNOSIS — K21.9 CHRONIC GERD: ICD-10-CM

## 2023-09-13 RX ORDER — LANSOPRAZOLE 30 MG/1
30 CAPSULE, DELAYED RELEASE ORAL DAILY
Qty: 30 CAPSULE | Refills: 11 | Status: SHIPPED | OUTPATIENT
Start: 2023-09-13 | End: 2023-10-17 | Stop reason: SDUPTHER

## 2023-09-24 ENCOUNTER — PATIENT MESSAGE (OUTPATIENT)
Dept: OTHER | Facility: OTHER | Age: 28
End: 2023-09-24
Payer: COMMERCIAL

## 2023-09-26 ENCOUNTER — ROUTINE PRENATAL (OUTPATIENT)
Dept: OBSTETRICS AND GYNECOLOGY | Facility: CLINIC | Age: 28
End: 2023-09-26
Payer: COMMERCIAL

## 2023-09-26 VITALS
SYSTOLIC BLOOD PRESSURE: 109 MMHG | BODY MASS INDEX: 23.45 KG/M2 | WEIGHT: 149.69 LBS | DIASTOLIC BLOOD PRESSURE: 63 MMHG

## 2023-09-26 DIAGNOSIS — Z3A.32 32 WEEKS GESTATION OF PREGNANCY: Primary | ICD-10-CM

## 2023-09-26 PROCEDURE — 0502F SUBSEQUENT PRENATAL CARE: CPT | Mod: S$GLB,,, | Performed by: NURSE PRACTITIONER

## 2023-09-26 PROCEDURE — 99999 PR PBB SHADOW E&M-EST. PATIENT-LVL III: CPT | Mod: PBBFAC,,, | Performed by: NURSE PRACTITIONER

## 2023-09-26 PROCEDURE — 99999 PR PBB SHADOW E&M-EST. PATIENT-LVL III: ICD-10-PCS | Mod: PBBFAC,,, | Performed by: NURSE PRACTITIONER

## 2023-09-26 PROCEDURE — 0502F PR SUBSEQUENT PRENATAL CARE: ICD-10-PCS | Mod: S$GLB,,, | Performed by: NURSE PRACTITIONER

## 2023-09-26 NOTE — PATIENT INSTRUCTIONS
LABOR AND DELIVERY PHONE NUMBER, [phone] (OPEN 24/7, LOCATED ON 6TH FLOOR OF HOSPITAL)  [address] PHONE NUMBER, [phone] (OPEN MON-FRI, 8a-5p)

## 2023-09-26 NOTE — PROGRESS NOTES
Here for routine OB appt at 32w2d, with no complaints. Getting tired at work, trouble falling asleep. Questions about sleep aids.  Reports good FM.  Denies LOF, denies VB, denies contractions.  Reviewed warning signs of Labor and Preeclampsia.  Daily FM counts reinforced.  F/U scheduled 2 weeks  Received tdap/rhogam   Growth scan ordered, S<D

## 2023-09-27 ENCOUNTER — PATIENT MESSAGE (OUTPATIENT)
Dept: MATERNAL FETAL MEDICINE | Facility: CLINIC | Age: 28
End: 2023-09-27
Payer: COMMERCIAL

## 2023-09-30 ENCOUNTER — PATIENT MESSAGE (OUTPATIENT)
Dept: OBSTETRICS AND GYNECOLOGY | Facility: CLINIC | Age: 28
End: 2023-09-30
Payer: COMMERCIAL

## 2023-10-03 ENCOUNTER — PROCEDURE VISIT (OUTPATIENT)
Dept: MATERNAL FETAL MEDICINE | Facility: CLINIC | Age: 28
End: 2023-10-03
Payer: COMMERCIAL

## 2023-10-03 DIAGNOSIS — Z3A.32 32 WEEKS GESTATION OF PREGNANCY: ICD-10-CM

## 2023-10-03 PROCEDURE — 76816 US MFM PROCEDURE (VIEWPOINT): ICD-10-PCS | Mod: S$GLB,,, | Performed by: OBSTETRICS & GYNECOLOGY

## 2023-10-03 PROCEDURE — 76816 OB US FOLLOW-UP PER FETUS: CPT | Mod: S$GLB,,, | Performed by: OBSTETRICS & GYNECOLOGY

## 2023-10-09 ENCOUNTER — OFFICE VISIT (OUTPATIENT)
Dept: OBSTETRICS AND GYNECOLOGY | Facility: CLINIC | Age: 28
End: 2023-10-09
Payer: COMMERCIAL

## 2023-10-09 DIAGNOSIS — Z34.91 ENCOUNTER FOR SUPERVISION OF NORMAL PREGNANCY IN FIRST TRIMESTER, UNSPECIFIED GRAVIDITY: Primary | ICD-10-CM

## 2023-10-09 PROCEDURE — 0502F SUBSEQUENT PRENATAL CARE: CPT | Mod: 95,,, | Performed by: STUDENT IN AN ORGANIZED HEALTH CARE EDUCATION/TRAINING PROGRAM

## 2023-10-09 PROCEDURE — 0502F PR SUBSEQUENT PRENATAL CARE: ICD-10-PCS | Mod: 95,,, | Performed by: STUDENT IN AN ORGANIZED HEALTH CARE EDUCATION/TRAINING PROGRAM

## 2023-10-09 NOTE — PROGRESS NOTES
The patient location is:  Patient Home   The chief complaint leading to consultation is: CRYSTAL  Visit type: Virtual visit with synchronous audio and video  Total time spent with patient: 30 min  Each patient to whom he or she provides medical services by telemedicine is:  (1) informed of the relationship between the physician and patient and the respective role of any other health care provider with respect to management of the patient; and (2) notified that he or she may decline to receive medical services by telemedicine and may withdraw from such care at any time.    Pt doing well. No complaints. Denies vaginal bleeding, LOF, contractions. Reports regular fetal movement. Denies symptoms of pre E.  Connected mom reviewed.  Growth scan reviewed  Reviewed consents  Reviewed routines/expectations on L&D/MBU  Labor and pre E precautions reviewed.   All questions answered  RTC: 2 weeks

## 2023-10-15 ENCOUNTER — PATIENT MESSAGE (OUTPATIENT)
Dept: OTHER | Facility: OTHER | Age: 28
End: 2023-10-15
Payer: COMMERCIAL

## 2023-10-17 ENCOUNTER — LAB VISIT (OUTPATIENT)
Dept: LAB | Facility: OTHER | Age: 28
End: 2023-10-17
Attending: STUDENT IN AN ORGANIZED HEALTH CARE EDUCATION/TRAINING PROGRAM
Payer: COMMERCIAL

## 2023-10-17 ENCOUNTER — ROUTINE PRENATAL (OUTPATIENT)
Dept: OBSTETRICS AND GYNECOLOGY | Facility: CLINIC | Age: 28
End: 2023-10-17
Payer: COMMERCIAL

## 2023-10-17 VITALS
DIASTOLIC BLOOD PRESSURE: 66 MMHG | BODY MASS INDEX: 23.34 KG/M2 | SYSTOLIC BLOOD PRESSURE: 104 MMHG | WEIGHT: 149.06 LBS

## 2023-10-17 DIAGNOSIS — Z67.91 RH NEGATIVE STATE IN ANTEPARTUM PERIOD: ICD-10-CM

## 2023-10-17 DIAGNOSIS — Z67.91 RH NEGATIVE STATE IN ANTEPARTUM PERIOD: Primary | ICD-10-CM

## 2023-10-17 DIAGNOSIS — O26.899 RH NEGATIVE STATE IN ANTEPARTUM PERIOD: Primary | ICD-10-CM

## 2023-10-17 DIAGNOSIS — Z34.91 ENCOUNTER FOR SUPERVISION OF NORMAL PREGNANCY IN FIRST TRIMESTER, UNSPECIFIED GRAVIDITY: ICD-10-CM

## 2023-10-17 DIAGNOSIS — K21.9 CHRONIC GERD: ICD-10-CM

## 2023-10-17 DIAGNOSIS — O26.899 RH NEGATIVE STATE IN ANTEPARTUM PERIOD: ICD-10-CM

## 2023-10-17 LAB
BASOPHILS # BLD AUTO: 0.05 K/UL (ref 0–0.2)
BASOPHILS NFR BLD: 0.5 % (ref 0–1.9)
DIFFERENTIAL METHOD: ABNORMAL
EOSINOPHIL # BLD AUTO: 0.1 K/UL (ref 0–0.5)
EOSINOPHIL NFR BLD: 0.7 % (ref 0–8)
ERYTHROCYTE [DISTWIDTH] IN BLOOD BY AUTOMATED COUNT: 12.5 % (ref 11.5–14.5)
HCT VFR BLD AUTO: 35.9 % (ref 37–48.5)
HGB BLD-MCNC: 11.7 G/DL (ref 12–16)
HIV 1+2 AB+HIV1 P24 AG SERPL QL IA: NORMAL
IMM GRANULOCYTES # BLD AUTO: 0.08 K/UL (ref 0–0.04)
IMM GRANULOCYTES NFR BLD AUTO: 0.7 % (ref 0–0.5)
LYMPHOCYTES # BLD AUTO: 2 K/UL (ref 1–4.8)
LYMPHOCYTES NFR BLD: 18.5 % (ref 18–48)
MCH RBC QN AUTO: 29.5 PG (ref 27–31)
MCHC RBC AUTO-ENTMCNC: 32.6 G/DL (ref 32–36)
MCV RBC AUTO: 91 FL (ref 82–98)
MONOCYTES # BLD AUTO: 0.8 K/UL (ref 0.3–1)
MONOCYTES NFR BLD: 7.8 % (ref 4–15)
NEUTROPHILS # BLD AUTO: 7.7 K/UL (ref 1.8–7.7)
NEUTROPHILS NFR BLD: 71.8 % (ref 38–73)
NRBC BLD-RTO: 0 /100 WBC
PLATELET # BLD AUTO: 198 K/UL (ref 150–450)
PMV BLD AUTO: 10.8 FL (ref 9.2–12.9)
RBC # BLD AUTO: 3.96 M/UL (ref 4–5.4)
WBC # BLD AUTO: 10.72 K/UL (ref 3.9–12.7)

## 2023-10-17 PROCEDURE — 87389 HIV-1 AG W/HIV-1&-2 AB AG IA: CPT | Performed by: STUDENT IN AN ORGANIZED HEALTH CARE EDUCATION/TRAINING PROGRAM

## 2023-10-17 PROCEDURE — 99999 PR PBB SHADOW E&M-EST. PATIENT-LVL III: ICD-10-PCS | Mod: PBBFAC,,, | Performed by: STUDENT IN AN ORGANIZED HEALTH CARE EDUCATION/TRAINING PROGRAM

## 2023-10-17 PROCEDURE — 87081 CULTURE SCREEN ONLY: CPT | Performed by: STUDENT IN AN ORGANIZED HEALTH CARE EDUCATION/TRAINING PROGRAM

## 2023-10-17 PROCEDURE — 0502F SUBSEQUENT PRENATAL CARE: CPT | Mod: S$GLB,,, | Performed by: STUDENT IN AN ORGANIZED HEALTH CARE EDUCATION/TRAINING PROGRAM

## 2023-10-17 PROCEDURE — 86592 SYPHILIS TEST NON-TREP QUAL: CPT | Performed by: STUDENT IN AN ORGANIZED HEALTH CARE EDUCATION/TRAINING PROGRAM

## 2023-10-17 PROCEDURE — 0502F PR SUBSEQUENT PRENATAL CARE: ICD-10-PCS | Mod: S$GLB,,, | Performed by: STUDENT IN AN ORGANIZED HEALTH CARE EDUCATION/TRAINING PROGRAM

## 2023-10-17 PROCEDURE — 85025 COMPLETE CBC W/AUTO DIFF WBC: CPT | Performed by: STUDENT IN AN ORGANIZED HEALTH CARE EDUCATION/TRAINING PROGRAM

## 2023-10-17 PROCEDURE — 99999 PR PBB SHADOW E&M-EST. PATIENT-LVL III: CPT | Mod: PBBFAC,,, | Performed by: STUDENT IN AN ORGANIZED HEALTH CARE EDUCATION/TRAINING PROGRAM

## 2023-10-17 PROCEDURE — 36415 COLL VENOUS BLD VENIPUNCTURE: CPT | Performed by: STUDENT IN AN ORGANIZED HEALTH CARE EDUCATION/TRAINING PROGRAM

## 2023-10-17 RX ORDER — LANSOPRAZOLE 30 MG/1
30 CAPSULE, DELAYED RELEASE ORAL DAILY
Qty: 90 CAPSULE | Refills: 3 | Status: ON HOLD | OUTPATIENT
Start: 2023-10-17 | End: 2023-11-14 | Stop reason: HOSPADM

## 2023-10-17 NOTE — PATIENT INSTRUCTIONS
[facility], Labor and Delivery is on the 6th floor of [facility]: [phone]    [email or web address]

## 2023-10-17 NOTE — PROGRESS NOTES
Pt doing well. Denies vaginal bleeding, LOF, contractions. Reports regular fetal movement. Denies symptoms of pre E.  VS reviewed.   Ordered today: GBS, 3 T labs  Consents reviewed and signed  Pt desires 39 week induction of labor. Has favorable cervix. Date requested  Labor and pre E precautions reviewed.   RTC: 2 weeks

## 2023-10-18 LAB — RPR SER QL: NORMAL

## 2023-10-19 LAB — BACTERIA SPEC AEROBE CULT: NORMAL

## 2023-10-20 DIAGNOSIS — F50.9 EATING DISORDER, UNSPECIFIED TYPE: ICD-10-CM

## 2023-10-20 DIAGNOSIS — F41.9 ANXIETY: ICD-10-CM

## 2023-10-20 RX ORDER — FLUOXETINE HYDROCHLORIDE 40 MG/1
40 CAPSULE ORAL DAILY
Qty: 30 CAPSULE | Refills: 11 | Status: SHIPPED | OUTPATIENT
Start: 2023-10-20 | End: 2024-10-19

## 2023-11-01 ENCOUNTER — ROUTINE PRENATAL (OUTPATIENT)
Dept: OBSTETRICS AND GYNECOLOGY | Facility: CLINIC | Age: 28
End: 2023-11-01
Payer: COMMERCIAL

## 2023-11-01 VITALS
BODY MASS INDEX: 23.45 KG/M2 | WEIGHT: 149.69 LBS | DIASTOLIC BLOOD PRESSURE: 68 MMHG | SYSTOLIC BLOOD PRESSURE: 110 MMHG

## 2023-11-01 DIAGNOSIS — Z34.91 ENCOUNTER FOR SUPERVISION OF NORMAL PREGNANCY IN FIRST TRIMESTER, UNSPECIFIED GRAVIDITY: Primary | ICD-10-CM

## 2023-11-01 DIAGNOSIS — O26.899 RH NEGATIVE STATE IN ANTEPARTUM PERIOD: ICD-10-CM

## 2023-11-01 DIAGNOSIS — Z67.91 RH NEGATIVE STATE IN ANTEPARTUM PERIOD: ICD-10-CM

## 2023-11-01 PROCEDURE — 0502F PR SUBSEQUENT PRENATAL CARE: ICD-10-PCS | Mod: S$GLB,,, | Performed by: STUDENT IN AN ORGANIZED HEALTH CARE EDUCATION/TRAINING PROGRAM

## 2023-11-01 PROCEDURE — 99999 PR PBB SHADOW E&M-EST. PATIENT-LVL III: ICD-10-PCS | Mod: PBBFAC,,, | Performed by: STUDENT IN AN ORGANIZED HEALTH CARE EDUCATION/TRAINING PROGRAM

## 2023-11-01 PROCEDURE — 0502F SUBSEQUENT PRENATAL CARE: CPT | Mod: S$GLB,,, | Performed by: STUDENT IN AN ORGANIZED HEALTH CARE EDUCATION/TRAINING PROGRAM

## 2023-11-01 PROCEDURE — 99999 PR PBB SHADOW E&M-EST. PATIENT-LVL III: CPT | Mod: PBBFAC,,, | Performed by: STUDENT IN AN ORGANIZED HEALTH CARE EDUCATION/TRAINING PROGRAM

## 2023-11-01 NOTE — PATIENT INSTRUCTIONS
[facility], Labor and Delivery is on the 6th floor of [facility]: [phone]    [email or web address]

## 2023-11-01 NOTE — PROGRESS NOTES
Pt doing well. Denies vaginal bleeding, LOF. Some irregular contractions. Reports regular fetal movement. Denies symptoms of pre E.  VS reviewed.  Labor and pre E precautions reviewed.   RTC: 1 week

## 2023-11-07 ENCOUNTER — ROUTINE PRENATAL (OUTPATIENT)
Dept: OBSTETRICS AND GYNECOLOGY | Facility: CLINIC | Age: 28
End: 2023-11-07
Payer: COMMERCIAL

## 2023-11-07 VITALS
WEIGHT: 151.44 LBS | BODY MASS INDEX: 23.72 KG/M2 | DIASTOLIC BLOOD PRESSURE: 70 MMHG | SYSTOLIC BLOOD PRESSURE: 106 MMHG

## 2023-11-07 DIAGNOSIS — Z34.91 ENCOUNTER FOR SUPERVISION OF NORMAL PREGNANCY IN FIRST TRIMESTER, UNSPECIFIED GRAVIDITY: Primary | ICD-10-CM

## 2023-11-07 DIAGNOSIS — Z67.91 RH NEGATIVE STATE IN ANTEPARTUM PERIOD: ICD-10-CM

## 2023-11-07 DIAGNOSIS — O26.899 RH NEGATIVE STATE IN ANTEPARTUM PERIOD: ICD-10-CM

## 2023-11-07 PROCEDURE — 99999 PR PBB SHADOW E&M-EST. PATIENT-LVL III: CPT | Mod: PBBFAC,,, | Performed by: STUDENT IN AN ORGANIZED HEALTH CARE EDUCATION/TRAINING PROGRAM

## 2023-11-07 PROCEDURE — 0502F SUBSEQUENT PRENATAL CARE: CPT | Mod: S$GLB,,, | Performed by: STUDENT IN AN ORGANIZED HEALTH CARE EDUCATION/TRAINING PROGRAM

## 2023-11-07 PROCEDURE — 0502F PR SUBSEQUENT PRENATAL CARE: ICD-10-PCS | Mod: S$GLB,,, | Performed by: STUDENT IN AN ORGANIZED HEALTH CARE EDUCATION/TRAINING PROGRAM

## 2023-11-07 PROCEDURE — 99999 PR PBB SHADOW E&M-EST. PATIENT-LVL III: ICD-10-PCS | Mod: PBBFAC,,, | Performed by: STUDENT IN AN ORGANIZED HEALTH CARE EDUCATION/TRAINING PROGRAM

## 2023-11-07 NOTE — PATIENT INSTRUCTIONS
[facility], Labor and Delivery is on the 6th floor of [facility]: [phone]    [email or web address]

## 2023-11-07 NOTE — PROGRESS NOTES
HISTORY AND PHYSICAL                                                OBSTETRICS          Subjective:       Opal Killian is a 28 y.o.  female with IUP at 38w2d weeks gestation who presents for routine OB visit. Patient denies vaginal bleeding, contractions, LOF.   Fetal Movement: normal.    This IUP is complicated by anxiety, failed 1 hr GTT, Rh negative status.      PMHx:   Past Medical History:   Diagnosis Date    Anxiety disorder, unspecified     Chronic GERD        PSHx:   Past Surgical History:   Procedure Laterality Date    ESOPHAGOGASTRODUODENOSCOPY N/A 2022    Procedure: EGD (ESOPHAGOGASTRODUODENOSCOPY);  Surgeon: Naun Gaitan MD;  Location: [facility];  Service: General;  Laterality: N/A;    tempanoplasty Bilateral        All: Review of patient's allergies indicates:  No Known Allergies    Meds: (Not in a hospital admission)      SH:   Social History     Socioeconomic History    Marital status:    Tobacco Use    Smoking status: Never    Smokeless tobacco: Never   Substance and Sexual Activity    Alcohol use: Not Currently     Comment: social    Drug use: Never    Sexual activity: Yes     Partners: Male     Birth control/protection: None       FH: No family history on file.    OBHx:   OB History    Para Term  AB Living   1 0 0 0 0 0   SAB IAB Ectopic Multiple Live Births   0 0 0 0 0      # Outcome Date GA Lbr Kapil/2nd Weight Sex Delivery Anes PTL Lv   1 Current                Objective:       LMP 2023     There were no vitals filed for this visit.    General:   alert, appears stated age and cooperative, no apparent distress   HENT:  normocephalic, atraumatic   Eyes:  extraocular movements and conjunctivae normal   Neck:  supple, range of motion normal, no thyromegaly   Lungs:   no respiratory distress   Heart:   regular rate   Abdomen:  Nontender, gravid   Extremities positive edema, negative erythema   FHT: verified   Presentations: cephalic by  leopolds   Cervix: 1/70/-3    Consistency: soft    Position: middle     Recent Growth Scan: 33 weeks Fetal size is AGA with the EFW at the 18% and the AC at the 41%. The EFW is 1988 g.     Lab Review  Blood Type O NEG  GBBS: negative  Rubella: Immune  RPR: nonreactive  HIV: negative  HepB: negative    Lab Results   Component Value Date    WBC 10.72 10/17/2023    HGB 11.7 (L) 10/17/2023    HCT 35.9 (L) 10/17/2023    MCV 91 10/17/2023     10/17/2023            Assessment:       38w2d weeks gestation     There are no hospital problems to display for this patient.         Plan:      Risks, benefits, alternatives and possible complications have been discussed in detail with the patient.   - Consents in media   - Pt instructed to present to Labor and Delivery unit at the time of labor or at time given to her by the nurse that will phone her  - Being induction per cervical exam upon arrival    Post-Partum Hemorrhage risk - low    Lily Pineda M.D.

## 2023-11-13 ENCOUNTER — HOSPITAL ENCOUNTER (INPATIENT)
Facility: OTHER | Age: 28
LOS: 3 days | Discharge: HOME | End: 2023-11-16
Attending: STUDENT IN AN ORGANIZED HEALTH CARE EDUCATION/TRAINING PROGRAM | Admitting: OBSTETRICS & GYNECOLOGY
Payer: COMMERCIAL

## 2023-11-13 ENCOUNTER — ANESTHESIA EVENT (OUTPATIENT)
Dept: OBSTETRICS AND GYNECOLOGY | Facility: OTHER | Age: 28
End: 2023-11-13
Payer: COMMERCIAL

## 2023-11-13 ENCOUNTER — PATIENT MESSAGE (OUTPATIENT)
Dept: OBSTETRICS AND GYNECOLOGY | Facility: OTHER | Age: 28
End: 2023-11-13
Payer: COMMERCIAL

## 2023-11-13 ENCOUNTER — ANESTHESIA (OUTPATIENT)
Dept: OBSTETRICS AND GYNECOLOGY | Facility: OTHER | Age: 28
End: 2023-11-13
Payer: COMMERCIAL

## 2023-11-13 DIAGNOSIS — Z34.90 ENCOUNTER FOR INDUCTION OF LABOR: ICD-10-CM

## 2023-11-13 DIAGNOSIS — Z37.9 VACUUM-ASSISTED VAGINAL DELIVERY: ICD-10-CM

## 2023-11-13 DIAGNOSIS — Z34.91 ENCOUNTER FOR SUPERVISION OF NORMAL PREGNANCY IN FIRST TRIMESTER, UNSPECIFIED GRAVIDITY: ICD-10-CM

## 2023-11-13 DIAGNOSIS — Z67.91 RH NEGATIVE STATE IN ANTEPARTUM PERIOD: ICD-10-CM

## 2023-11-13 DIAGNOSIS — O26.899 RH NEGATIVE STATE IN ANTEPARTUM PERIOD: ICD-10-CM

## 2023-11-13 PROBLEM — F39 MOOD DISORDER: Status: ACTIVE | Noted: 2023-11-13

## 2023-11-13 LAB
ABO + RH BLD: ABNORMAL
BASOPHILS # BLD AUTO: 0.05 K/UL (ref 0–0.2)
BASOPHILS NFR BLD: 0.5 % (ref 0–1.9)
BLD GP AB SCN CELLS X3 SERPL QL: ABNORMAL
BLOOD GROUP ANTIBODIES SERPL: NORMAL
DIFFERENTIAL METHOD: NORMAL
EOSINOPHIL # BLD AUTO: 0 K/UL (ref 0–0.5)
EOSINOPHIL NFR BLD: 0.4 % (ref 0–8)
ERYTHROCYTE [DISTWIDTH] IN BLOOD BY AUTOMATED COUNT: 12.6 % (ref 11.5–14.5)
HCT VFR BLD AUTO: 39.5 % (ref 37–48.5)
HGB BLD-MCNC: 13 G/DL (ref 12–16)
IMM GRANULOCYTES # BLD AUTO: 0.04 K/UL (ref 0–0.04)
IMM GRANULOCYTES NFR BLD AUTO: 0.4 % (ref 0–0.5)
LYMPHOCYTES # BLD AUTO: 2 K/UL (ref 1–4.8)
LYMPHOCYTES NFR BLD: 18.9 % (ref 18–48)
MCH RBC QN AUTO: 28.6 PG (ref 27–31)
MCHC RBC AUTO-ENTMCNC: 32.9 G/DL (ref 32–36)
MCV RBC AUTO: 87 FL (ref 82–98)
MONOCYTES # BLD AUTO: 0.8 K/UL (ref 0.3–1)
MONOCYTES NFR BLD: 7.3 % (ref 4–15)
NEUTROPHILS # BLD AUTO: 7.5 K/UL (ref 1.8–7.7)
NEUTROPHILS NFR BLD: 72.5 % (ref 38–73)
NRBC BLD-RTO: 0 /100 WBC
PLATELET # BLD AUTO: 208 K/UL (ref 150–450)
PMV BLD AUTO: 11.3 FL (ref 9.2–12.9)
RBC # BLD AUTO: 4.54 M/UL (ref 4–5.4)
SPECIMEN OUTDATE: ABNORMAL
WBC # BLD AUTO: 10.38 K/UL (ref 3.9–12.7)

## 2023-11-13 PROCEDURE — 63600175 PHARM REV CODE 636 W HCPCS: Performed by: STUDENT IN AN ORGANIZED HEALTH CARE EDUCATION/TRAINING PROGRAM

## 2023-11-13 PROCEDURE — 63600175 PHARM REV CODE 636 W HCPCS

## 2023-11-13 PROCEDURE — 11000001 HC ACUTE MED/SURG PRIVATE ROOM

## 2023-11-13 PROCEDURE — 27200710 HC EPIDURAL INFUSION PUMP SET: Performed by: ANESTHESIOLOGY

## 2023-11-13 PROCEDURE — 86901 BLOOD TYPING SEROLOGIC RH(D): CPT | Performed by: STUDENT IN AN ORGANIZED HEALTH CARE EDUCATION/TRAINING PROGRAM

## 2023-11-13 PROCEDURE — 86870 RBC ANTIBODY IDENTIFICATION: CPT | Performed by: STUDENT IN AN ORGANIZED HEALTH CARE EDUCATION/TRAINING PROGRAM

## 2023-11-13 PROCEDURE — 25000003 PHARM REV CODE 250

## 2023-11-13 PROCEDURE — C1751 CATH, INF, PER/CENT/MIDLINE: HCPCS | Performed by: ANESTHESIOLOGY

## 2023-11-13 PROCEDURE — 62326 NJX INTERLAMINAR LMBR/SAC: CPT

## 2023-11-13 PROCEDURE — 59400 PRA FULL ROUT OBSTE CARE,VAGINAL DELIV: ICD-10-PCS | Mod: AA,,, | Performed by: ANESTHESIOLOGY

## 2023-11-13 PROCEDURE — 59400 OBSTETRICAL CARE: CPT | Mod: AA,,, | Performed by: ANESTHESIOLOGY

## 2023-11-13 PROCEDURE — 85025 COMPLETE CBC W/AUTO DIFF WBC: CPT | Performed by: STUDENT IN AN ORGANIZED HEALTH CARE EDUCATION/TRAINING PROGRAM

## 2023-11-13 PROCEDURE — 51702 INSERT TEMP BLADDER CATH: CPT

## 2023-11-13 RX ORDER — SODIUM CHLORIDE 0.9 % (FLUSH) 0.9 %
2 SYRINGE (ML) INJECTION
Status: DISCONTINUED | OUTPATIENT
Start: 2023-11-13 | End: 2023-11-14

## 2023-11-13 RX ORDER — OXYTOCIN/RINGER'S LACTATE 30/500 ML
95 PLASTIC BAG, INJECTION (ML) INTRAVENOUS ONCE AS NEEDED
Status: DISCONTINUED | OUTPATIENT
Start: 2023-11-13 | End: 2023-11-14

## 2023-11-13 RX ORDER — FLUOXETINE HYDROCHLORIDE 20 MG/1
40 CAPSULE ORAL NIGHTLY
Status: DISCONTINUED | OUTPATIENT
Start: 2023-11-13 | End: 2023-11-16 | Stop reason: HOSPADM

## 2023-11-13 RX ORDER — OXYTOCIN/RINGER'S LACTATE 30/500 ML
334 PLASTIC BAG, INJECTION (ML) INTRAVENOUS ONCE
Status: DISCONTINUED | OUTPATIENT
Start: 2023-11-13 | End: 2023-11-14

## 2023-11-13 RX ORDER — FLUOXETINE HYDROCHLORIDE 20 MG/1
40 CAPSULE ORAL DAILY
Status: DISCONTINUED | OUTPATIENT
Start: 2023-11-14 | End: 2023-11-13

## 2023-11-13 RX ORDER — FENTANYL/BUPIVACAINE/NS/PF 2MCG/ML-.1
PLASTIC BAG, INJECTION (ML) INJECTION
Status: DISCONTINUED | OUTPATIENT
Start: 2023-11-13 | End: 2023-11-15

## 2023-11-13 RX ORDER — ONDANSETRON 8 MG/1
8 TABLET, ORALLY DISINTEGRATING ORAL EVERY 8 HOURS PRN
Status: DISCONTINUED | OUTPATIENT
Start: 2023-11-13 | End: 2023-11-14

## 2023-11-13 RX ORDER — CARBOPROST TROMETHAMINE 250 UG/ML
250 INJECTION, SOLUTION INTRAMUSCULAR
Status: DISCONTINUED | OUTPATIENT
Start: 2023-11-13 | End: 2023-11-14

## 2023-11-13 RX ORDER — OXYTOCIN/RINGER'S LACTATE 30/500 ML
95 PLASTIC BAG, INJECTION (ML) INTRAVENOUS ONCE
Status: DISCONTINUED | OUTPATIENT
Start: 2023-11-13 | End: 2023-11-14

## 2023-11-13 RX ORDER — LIDOCAINE HYDROCHLORIDE 10 MG/ML
10 INJECTION INFILTRATION; PERINEURAL ONCE AS NEEDED
Status: DISCONTINUED | OUTPATIENT
Start: 2023-11-13 | End: 2023-11-14

## 2023-11-13 RX ORDER — DIPHENOXYLATE HYDROCHLORIDE AND ATROPINE SULFATE 2.5; .025 MG/1; MG/1
2 TABLET ORAL EVERY 6 HOURS PRN
Status: DISCONTINUED | OUTPATIENT
Start: 2023-11-13 | End: 2023-11-14

## 2023-11-13 RX ORDER — METHYLERGONOVINE MALEATE 0.2 MG/ML
200 INJECTION INTRAVENOUS ONCE AS NEEDED
Status: COMPLETED | OUTPATIENT
Start: 2023-11-13 | End: 2023-11-14

## 2023-11-13 RX ORDER — TRANEXAMIC ACID 10 MG/ML
1000 INJECTION, SOLUTION INTRAVENOUS EVERY 30 MIN PRN
Status: DISCONTINUED | OUTPATIENT
Start: 2023-11-13 | End: 2023-11-14

## 2023-11-13 RX ORDER — MISOPROSTOL 200 UG/1
800 TABLET ORAL ONCE AS NEEDED
Status: DISCONTINUED | OUTPATIENT
Start: 2023-11-13 | End: 2023-11-14

## 2023-11-13 RX ORDER — CALCIUM CARBONATE 200(500)MG
500 TABLET,CHEWABLE ORAL 3 TIMES DAILY PRN
Status: DISCONTINUED | OUTPATIENT
Start: 2023-11-13 | End: 2023-11-14

## 2023-11-13 RX ORDER — PROCHLORPERAZINE EDISYLATE 5 MG/ML
5 INJECTION INTRAMUSCULAR; INTRAVENOUS EVERY 6 HOURS PRN
Status: DISCONTINUED | OUTPATIENT
Start: 2023-11-13 | End: 2023-11-14

## 2023-11-13 RX ORDER — SODIUM CHLORIDE, SODIUM LACTATE, POTASSIUM CHLORIDE, CALCIUM CHLORIDE 600; 310; 30; 20 MG/100ML; MG/100ML; MG/100ML; MG/100ML
INJECTION, SOLUTION INTRAVENOUS CONTINUOUS
Status: DISCONTINUED | OUTPATIENT
Start: 2023-11-13 | End: 2023-11-14

## 2023-11-13 RX ORDER — SODIUM CHLORIDE 9 MG/ML
INJECTION, SOLUTION INTRAVENOUS
Status: DISCONTINUED | OUTPATIENT
Start: 2023-11-13 | End: 2023-11-14

## 2023-11-13 RX ORDER — OXYTOCIN/RINGER'S LACTATE 30/500 ML
334 PLASTIC BAG, INJECTION (ML) INTRAVENOUS ONCE AS NEEDED
Status: COMPLETED | OUTPATIENT
Start: 2023-11-13 | End: 2023-11-14

## 2023-11-13 RX ORDER — SIMETHICONE 80 MG
1 TABLET,CHEWABLE ORAL 4 TIMES DAILY PRN
Status: DISCONTINUED | OUTPATIENT
Start: 2023-11-13 | End: 2023-11-14

## 2023-11-13 RX ORDER — MISOPROSTOL 200 UG/1
800 TABLET ORAL ONCE AS NEEDED
Status: COMPLETED | OUTPATIENT
Start: 2023-11-13 | End: 2023-11-14

## 2023-11-13 RX ORDER — OXYTOCIN/RINGER'S LACTATE 30/500 ML
0-30 PLASTIC BAG, INJECTION (ML) INTRAVENOUS CONTINUOUS
Status: DISCONTINUED | OUTPATIENT
Start: 2023-11-13 | End: 2023-11-14

## 2023-11-13 RX ORDER — OXYTOCIN 10 [USP'U]/ML
10 INJECTION, SOLUTION INTRAMUSCULAR; INTRAVENOUS ONCE AS NEEDED
Status: DISCONTINUED | OUTPATIENT
Start: 2023-11-13 | End: 2023-11-14

## 2023-11-13 RX ORDER — FENTANYL/BUPIVACAINE/NS/PF 2MCG/ML-.1
PLASTIC BAG, INJECTION (ML) INJECTION
Status: COMPLETED
Start: 2023-11-13 | End: 2023-11-13

## 2023-11-13 RX ADMIN — FLUOXETINE 40 MG: 20 CAPSULE ORAL at 08:11

## 2023-11-13 RX ADMIN — Medication 4 MILLI-UNITS/MIN: at 06:11

## 2023-11-13 RX ADMIN — Medication 8 ML/HR: at 10:11

## 2023-11-13 RX ADMIN — Medication 4 ML: at 10:11

## 2023-11-13 RX ADMIN — SODIUM CHLORIDE, SODIUM LACTATE, POTASSIUM CHLORIDE, AND CALCIUM CHLORIDE: .6; .31; .03; .02 INJECTION, SOLUTION INTRAVENOUS at 05:11

## 2023-11-13 NOTE — H&P
HISTORY AND PHYSICAL                                                OBSTETRICS          Subjective:       Opal Killian is a 28 y.o.  female with IUP at 39w1d weeks gestation who presents with IOL.    Patient reports contractions, denies vaginal bleeding, denies LOF.   Fetal Movement: normal.    This IUP is complicated by Rh neg status, mood disorder.    Review of Systems   Constitutional:  Negative for chills and fever.   HENT:  Negative for nasal congestion and mouth sores.    Eyes:  Negative for visual disturbance.   Respiratory:  Negative for cough and shortness of breath.    Cardiovascular:  Negative for chest pain, palpitations and leg swelling.   Gastrointestinal:  Negative for constipation, diarrhea, nausea and vomiting.   Genitourinary:  Negative for dysuria, pelvic pain, vaginal bleeding and vaginal discharge.   Musculoskeletal:  Negative for joint swelling.   Integumentary:  Negative for rash.   Neurological:  Negative for syncope, numbness and headaches.   Psychiatric/Behavioral:  The patient is not nervous/anxious.        PMHx:   Past Medical History:   Diagnosis Date    Anxiety disorder, unspecified     Chronic GERD        PSHx:   Past Surgical History:   Procedure Laterality Date    ESOPHAGOGASTRODUODENOSCOPY N/A 2022    Procedure: EGD (ESOPHAGOGASTRODUODENOSCOPY);  Surgeon: Naun Gaitan MD;  Location: [facility];  Service: General;  Laterality: N/A;    tempanoplasty Bilateral        All: Review of patient's allergies indicates:  No Known Allergies    Meds:   Medications Prior to Admission   Medication Sig Dispense Refill Last Dose    FLUoxetine 40 MG capsule Take 1 capsule (40 mg total) by mouth once daily. 30 capsule 11     lansoprazole (PREVACID) 30 MG capsule Take 1 capsule (30 mg total) by mouth once daily. 90 capsule 3     ondansetron (ZOFRAN-ODT) 4 MG TbDL Take 2 tablets (8 mg total) by mouth 2 (two) times daily. 30 tablet 1     prenatal vit no.124/iron/folic  (PRENATAL VITAMIN ORAL)           SH:   Social History     Socioeconomic History    Marital status:    Tobacco Use    Smoking status: Never    Smokeless tobacco: Never   Substance and Sexual Activity    Alcohol use: Not Currently     Comment: social    Drug use: Never    Sexual activity: Yes     Partners: Male     Birth control/protection: None       FH: No family history on file.    OBHx:   OB History    Para Term  AB Living   1 0 0 0 0 0   SAB IAB Ectopic Multiple Live Births   0 0 0 0 0      # Outcome Date GA Lbr Kapil/2nd Weight Sex Delivery Anes PTL Lv   1 Current                Objective:       /64 (BP Location: Left arm, Patient Position: Lying)   Pulse 87   Temp 97.7 °F (36.5 °C) (Oral)   Resp 18   LMP 2023   SpO2 97%     Vitals:    23 1747 23 174   BP: 106/64    BP Location: Left arm    Patient Position: Lying    Pulse: 95 87   Resp: 18    Temp: 97.7 °F (36.5 °C)    TempSrc: Oral    SpO2:  97%       General: NAD, alert, oriented, cooperative  HEENT: NCAT, EOM grossly intact  Lungs: Normal WOB  Heart: regular rate  Abdomen: gravid, soft, nondistended, nontender, no rebound or guarding  Extremities: no edema    FHT: 150 bpm, moderate BTBV, +accels, -decels; Cat 1 (reassuring)  [place]: q 5mins  Presentation: cephalic by ultrasound    Cervix: 3/70/-3    EFW by Leopold's: 6    Maternal pelvis not yet proven    Lab Review  Blood Type O NEG  GBBS: negative  Rubella: Immune  RPR: NR  HIV: negative  HepB: negative       Assessment:       39w1d weeks gestation with IOL    Active Hospital Problems    Diagnosis  POA    *Encounter for induction of labor [Z34.90]  Not Applicable    Mood disorder [F39]  Yes    Rh negative state in antepartum period [O26.899, Z67.91]  Yes      Resolved Hospital Problems   No resolved problems to display.          Plan:     IOL   Risks, benefits, alternatives and possible complications have been discussed in detail with the patient.   -  Consents signed and to chart  - Admit to Labor and Delivery unit  - Epidural per Anesthesia  - Draw CBC, T&S  - Notify Staff  - Recheck in 4 hrs or PRN  - Post-Partum Hemorrhage risk - low  - Postpartum lovenox: not indicated  - Contraception: int IUD  - Plan for pitocin    2. Rh negative status  - Will need postpartum rhogam work up    3. Mood disorder   - continue fluoxetine 40 daily  - Will need 2 week post partum mood check       Elana Mckeon MD  OB/GYN PGY1

## 2023-11-14 PROBLEM — Z34.90 ENCOUNTER FOR INDUCTION OF LABOR: Status: RESOLVED | Noted: 2023-11-13 | Resolved: 2023-11-14

## 2023-11-14 PROBLEM — Z37.9 VACUUM-ASSISTED VAGINAL DELIVERY: Status: ACTIVE | Noted: 2023-11-14

## 2023-11-14 LAB
ABO + RH BLD: ABNORMAL
ALLENS TEST: ABNORMAL
ALLENS TEST: ABNORMAL
BLD GP AB SCN CELLS X3 SERPL QL: ABNORMAL
FETAL CELL SCN BLD QL ROSETTE: NORMAL
HCO3 UR-SCNC: 16.8 MMOL/L (ref 12–29)
HCO3 UR-SCNC: 19.2 MMOL/L (ref 17–27)
HCT VFR BLD CALC: 43 %PCV (ref 36–54)
HCT VFR BLD CALC: 44 %PCV (ref 36–54)
HGB BLD-MCNC: 15 G/DL
HGB BLD-MCNC: 15 G/DL
PCO2 BLDA: 37.2 MMHG (ref 27–49)
PCO2 BLDA: 54 MMHG (ref 32–66)
PH SMN: 7.16 [PH] (ref 7.18–7.38)
PH SMN: 7.26 [PH] (ref 7.25–7.45)
PO2 BLDA: 23 MMHG (ref 6–31)
PO2 BLDA: 36 MMHG (ref 17–41)
POC BE: -10 MMOL/L
POC BE: -9 MMOL/L
POC IONIZED CALCIUM: 1.29 MMOL/L (ref 1.06–1.42)
POC IONIZED CALCIUM: 1.43 MMOL/L (ref 1.06–1.42)
POC SATURATED O2: 26 %
POC SATURATED O2: 60 %
POC TCO2: 18 MMOL/L (ref 23–27)
POC TCO2: 21 MMOL/L (ref 23–27)
POTASSIUM BLD-SCNC: 5.5 MMOL/L (ref 3.5–5.1)
POTASSIUM BLD-SCNC: 6.2 MMOL/L (ref 3.5–5.1)
SAMPLE: ABNORMAL
SAMPLE: ABNORMAL
SITE: ABNORMAL
SITE: ABNORMAL
SODIUM BLD-SCNC: 135 MMOL/L (ref 136–145)
SODIUM BLD-SCNC: 137 MMOL/L (ref 136–145)

## 2023-11-14 PROCEDURE — 25000003 PHARM REV CODE 250: Performed by: STUDENT IN AN ORGANIZED HEALTH CARE EDUCATION/TRAINING PROGRAM

## 2023-11-14 PROCEDURE — 25000003 PHARM REV CODE 250

## 2023-11-14 PROCEDURE — 59400 OBSTETRICAL CARE: CPT | Mod: ,,, | Performed by: OBSTETRICS & GYNECOLOGY

## 2023-11-14 PROCEDURE — 63600175 PHARM REV CODE 636 W HCPCS

## 2023-11-14 PROCEDURE — 11000001 HC ACUTE MED/SURG PRIVATE ROOM

## 2023-11-14 PROCEDURE — 72100003 HC LABOR CARE, EA. ADDL. 8 HRS

## 2023-11-14 PROCEDURE — 99900035 HC TECH TIME PER 15 MIN (STAT)

## 2023-11-14 PROCEDURE — 72200006 HC VAGINAL DELIVERY LEVEL III

## 2023-11-14 PROCEDURE — 36416 COLLJ CAPILLARY BLOOD SPEC: CPT

## 2023-11-14 PROCEDURE — 59400 PR FULL ROUT OBSTE CARE,VAGINAL DELIV: ICD-10-PCS | Mod: ,,, | Performed by: OBSTETRICS & GYNECOLOGY

## 2023-11-14 PROCEDURE — 27000181 HC CABLE, IUPC

## 2023-11-14 PROCEDURE — 36415 COLL VENOUS BLD VENIPUNCTURE: CPT | Performed by: STUDENT IN AN ORGANIZED HEALTH CARE EDUCATION/TRAINING PROGRAM

## 2023-11-14 PROCEDURE — 86901 BLOOD TYPING SEROLOGIC RH(D): CPT | Performed by: STUDENT IN AN ORGANIZED HEALTH CARE EDUCATION/TRAINING PROGRAM

## 2023-11-14 PROCEDURE — 72100002 HC LABOR CARE, 1ST 8 HOURS

## 2023-11-14 PROCEDURE — 85461 HEMOGLOBIN FETAL: CPT | Performed by: STUDENT IN AN ORGANIZED HEALTH CARE EDUCATION/TRAINING PROGRAM

## 2023-11-14 RX ORDER — OXYTOCIN/RINGER'S LACTATE 30/500 ML
95 PLASTIC BAG, INJECTION (ML) INTRAVENOUS ONCE
Status: DISCONTINUED | OUTPATIENT
Start: 2023-11-14 | End: 2023-11-14

## 2023-11-14 RX ORDER — ONDANSETRON 8 MG/1
8 TABLET, ORALLY DISINTEGRATING ORAL EVERY 8 HOURS PRN
Status: DISCONTINUED | OUTPATIENT
Start: 2023-11-14 | End: 2023-11-16 | Stop reason: HOSPADM

## 2023-11-14 RX ORDER — OXYTOCIN 10 [USP'U]/ML
10 INJECTION, SOLUTION INTRAMUSCULAR; INTRAVENOUS ONCE AS NEEDED
Status: DISCONTINUED | OUTPATIENT
Start: 2023-11-14 | End: 2023-11-16 | Stop reason: HOSPADM

## 2023-11-14 RX ORDER — TERBUTALINE SULFATE 1 MG/ML
INJECTION SUBCUTANEOUS
Status: DISPENSED
Start: 2023-11-14 | End: 2023-11-14

## 2023-11-14 RX ORDER — IBUPROFEN 600 MG/1
600 TABLET ORAL EVERY 6 HOURS
Status: DISCONTINUED | OUTPATIENT
Start: 2023-11-14 | End: 2023-11-16 | Stop reason: HOSPADM

## 2023-11-14 RX ORDER — SODIUM CITRATE AND CITRIC ACID MONOHYDRATE 334; 500 MG/5ML; MG/5ML
30 SOLUTION ORAL ONCE
Status: DISCONTINUED | OUTPATIENT
Start: 2023-11-14 | End: 2023-11-14

## 2023-11-14 RX ORDER — SODIUM CHLORIDE 0.9 % (FLUSH) 0.9 %
10 SYRINGE (ML) INJECTION
Status: DISCONTINUED | OUTPATIENT
Start: 2023-11-14 | End: 2023-11-16 | Stop reason: HOSPADM

## 2023-11-14 RX ORDER — CARBOPROST TROMETHAMINE 250 UG/ML
250 INJECTION, SOLUTION INTRAMUSCULAR
Status: DISCONTINUED | OUTPATIENT
Start: 2023-11-14 | End: 2023-11-16 | Stop reason: HOSPADM

## 2023-11-14 RX ORDER — ACETAMINOPHEN 325 MG/1
650 TABLET ORAL EVERY 6 HOURS PRN
Status: DISCONTINUED | OUTPATIENT
Start: 2023-11-14 | End: 2023-11-16 | Stop reason: HOSPADM

## 2023-11-14 RX ORDER — DIPHENHYDRAMINE HCL 25 MG
25 CAPSULE ORAL EVERY 4 HOURS PRN
Status: DISCONTINUED | OUTPATIENT
Start: 2023-11-14 | End: 2023-11-16 | Stop reason: HOSPADM

## 2023-11-14 RX ORDER — HYDROCODONE BITARTRATE AND ACETAMINOPHEN 10; 325 MG/1; MG/1
1 TABLET ORAL EVERY 4 HOURS PRN
Status: DISCONTINUED | OUTPATIENT
Start: 2023-11-14 | End: 2023-11-16 | Stop reason: HOSPADM

## 2023-11-14 RX ORDER — DIPHENOXYLATE HYDROCHLORIDE AND ATROPINE SULFATE 2.5; .025 MG/1; MG/1
2 TABLET ORAL EVERY 6 HOURS PRN
Status: DISCONTINUED | OUTPATIENT
Start: 2023-11-14 | End: 2023-11-16 | Stop reason: HOSPADM

## 2023-11-14 RX ORDER — PRENATAL WITH FERROUS FUM AND FOLIC ACID 3080; 920; 120; 400; 22; 1.84; 3; 20; 10; 1; 12; 200; 27; 25; 2 [IU]/1; [IU]/1; MG/1; [IU]/1; MG/1; MG/1; MG/1; MG/1; MG/1; MG/1; UG/1; MG/1; MG/1; MG/1; MG/1
1 TABLET ORAL DAILY
Status: DISCONTINUED | OUTPATIENT
Start: 2023-11-14 | End: 2023-11-16 | Stop reason: HOSPADM

## 2023-11-14 RX ORDER — HYDROCORTISONE 25 MG/G
CREAM TOPICAL 3 TIMES DAILY PRN
Status: DISCONTINUED | OUTPATIENT
Start: 2023-11-14 | End: 2023-11-16 | Stop reason: HOSPADM

## 2023-11-14 RX ORDER — METHYLERGONOVINE MALEATE 0.2 MG/ML
200 INJECTION INTRAVENOUS ONCE AS NEEDED
Status: DISCONTINUED | OUTPATIENT
Start: 2023-11-14 | End: 2023-11-16 | Stop reason: HOSPADM

## 2023-11-14 RX ORDER — IBUPROFEN 600 MG/1
600 TABLET ORAL EVERY 6 HOURS PRN
Qty: 30 TABLET | Refills: 1 | Status: SHIPPED | OUTPATIENT
Start: 2023-11-14 | End: 2024-01-22

## 2023-11-14 RX ORDER — DIPHENHYDRAMINE HYDROCHLORIDE 50 MG/ML
25 INJECTION INTRAMUSCULAR; INTRAVENOUS EVERY 4 HOURS PRN
Status: DISCONTINUED | OUTPATIENT
Start: 2023-11-14 | End: 2023-11-16 | Stop reason: HOSPADM

## 2023-11-14 RX ORDER — OXYTOCIN/RINGER'S LACTATE 30/500 ML
0-30 PLASTIC BAG, INJECTION (ML) INTRAVENOUS CONTINUOUS
Status: DISCONTINUED | OUTPATIENT
Start: 2023-11-14 | End: 2023-11-14

## 2023-11-14 RX ORDER — FAMOTIDINE 10 MG/ML
20 INJECTION INTRAVENOUS ONCE
Status: DISCONTINUED | OUTPATIENT
Start: 2023-11-14 | End: 2023-11-14

## 2023-11-14 RX ORDER — OXYTOCIN/RINGER'S LACTATE 30/500 ML
334 PLASTIC BAG, INJECTION (ML) INTRAVENOUS ONCE AS NEEDED
Status: DISCONTINUED | OUTPATIENT
Start: 2023-11-14 | End: 2023-11-16 | Stop reason: HOSPADM

## 2023-11-14 RX ORDER — TRANEXAMIC ACID 10 MG/ML
1000 INJECTION, SOLUTION INTRAVENOUS EVERY 30 MIN PRN
Status: DISCONTINUED | OUTPATIENT
Start: 2023-11-14 | End: 2023-11-16 | Stop reason: HOSPADM

## 2023-11-14 RX ORDER — DOCUSATE SODIUM 100 MG/1
200 CAPSULE, LIQUID FILLED ORAL 2 TIMES DAILY PRN
Status: DISCONTINUED | OUTPATIENT
Start: 2023-11-14 | End: 2023-11-16 | Stop reason: HOSPADM

## 2023-11-14 RX ORDER — POLYETHYLENE GLYCOL 3350 17 G/17G
17 POWDER, FOR SOLUTION ORAL DAILY
Qty: 510 G | Refills: 0 | Status: SHIPPED | OUTPATIENT
Start: 2023-11-14 | End: 2024-01-22

## 2023-11-14 RX ORDER — FENTANYL/BUPIVACAINE/NS/PF 2MCG/ML-.1
PLASTIC BAG, INJECTION (ML) INJECTION CONTINUOUS
Status: DISCONTINUED | OUTPATIENT
Start: 2023-11-14 | End: 2023-11-14

## 2023-11-14 RX ORDER — PROCHLORPERAZINE EDISYLATE 5 MG/ML
5 INJECTION INTRAMUSCULAR; INTRAVENOUS EVERY 6 HOURS PRN
Status: DISCONTINUED | OUTPATIENT
Start: 2023-11-14 | End: 2023-11-16 | Stop reason: HOSPADM

## 2023-11-14 RX ORDER — MISOPROSTOL 200 UG/1
800 TABLET ORAL ONCE AS NEEDED
Status: DISCONTINUED | OUTPATIENT
Start: 2023-11-14 | End: 2023-11-16 | Stop reason: HOSPADM

## 2023-11-14 RX ORDER — SIMETHICONE 80 MG
1 TABLET,CHEWABLE ORAL EVERY 6 HOURS PRN
Status: DISCONTINUED | OUTPATIENT
Start: 2023-11-14 | End: 2023-11-16 | Stop reason: HOSPADM

## 2023-11-14 RX ORDER — HYDROCODONE BITARTRATE AND ACETAMINOPHEN 5; 325 MG/1; MG/1
1 TABLET ORAL EVERY 4 HOURS PRN
Status: DISCONTINUED | OUTPATIENT
Start: 2023-11-14 | End: 2023-11-16 | Stop reason: HOSPADM

## 2023-11-14 RX ORDER — OXYTOCIN/RINGER'S LACTATE 30/500 ML
95 PLASTIC BAG, INJECTION (ML) INTRAVENOUS ONCE AS NEEDED
Status: DISCONTINUED | OUTPATIENT
Start: 2023-11-14 | End: 2023-11-16 | Stop reason: HOSPADM

## 2023-11-14 RX ORDER — PANTOPRAZOLE SODIUM 40 MG/1
40 TABLET, DELAYED RELEASE ORAL DAILY
Status: DISCONTINUED | OUTPATIENT
Start: 2023-11-14 | End: 2023-11-16 | Stop reason: HOSPADM

## 2023-11-14 RX ADMIN — ACETAMINOPHEN 650 MG: 325 TABLET, FILM COATED ORAL at 08:11

## 2023-11-14 RX ADMIN — IBUPROFEN 600 MG: 600 TABLET, FILM COATED ORAL at 12:11

## 2023-11-14 RX ADMIN — MISOPROSTOL 800 MCG: 200 TABLET ORAL at 03:11

## 2023-11-14 RX ADMIN — IBUPROFEN 600 MG: 600 TABLET, FILM COATED ORAL at 06:11

## 2023-11-14 RX ADMIN — PRENATAL VIT W/ FE FUMARATE-FA TAB 27-0.8 MG 1 TABLET: 27-0.8 TAB at 08:11

## 2023-11-14 RX ADMIN — DOCUSATE SODIUM 200 MG: 100 CAPSULE, LIQUID FILLED ORAL at 08:11

## 2023-11-14 RX ADMIN — METHYLERGONOVINE MALEATE 200 MCG: 0.2 INJECTION, SOLUTION INTRAMUSCULAR; INTRAVENOUS at 03:11

## 2023-11-14 RX ADMIN — ACETAMINOPHEN 650 MG: 325 TABLET, FILM COATED ORAL at 02:11

## 2023-11-14 RX ADMIN — PANTOPRAZOLE SODIUM 40 MG: 40 TABLET, DELAYED RELEASE ORAL at 02:11

## 2023-11-14 RX ADMIN — FLUOXETINE 40 MG: 20 CAPSULE ORAL at 08:11

## 2023-11-14 RX ADMIN — Medication 334 MILLI-UNITS/MIN: at 03:11

## 2023-11-14 RX ADMIN — SODIUM CHLORIDE 300 ML: 9 INJECTION, SOLUTION INTRAVENOUS at 01:11

## 2023-11-14 NOTE — CARE UPDATE
To bedside, FHT with early/late decels with each contraction. SVE unchanged 5/90/-2, IUPC placed. Maternal repositioning without improvement. VSS and WNL. Pitocin paused and fluid bolus given with return to baseline and now Cat 1 FHT. Will continue close maternal and fetal monitoring.

## 2023-11-14 NOTE — ANESTHESIA PREPROCEDURE EVALUATION
[facility]  Obstetric Anesthesia Pre-Procedure Evaluation         Patient Name: Opal Killian  YOB: 1995  MRN: [id number]    2023    SUBJECTIVE:     Opal Killian is a 28 y.o. female  at 39w1d who presents for scheduled term IOL. Current IUP has been complicated by GERD and anxiety.     She denies previous neuraxial anesthesia. She would like an epidural with this delivery.  She denies issues with previous general anesthesia.     She denies history of HTN, asthma, bleeding or coagulation disorders, spine abnormalities, or previous back surgeries.      OB History    Para Term  AB Living   1 0 0 0 0 0   SAB IAB Ectopic Multiple Live Births   0 0 0 0 0      # Outcome Date GA Lbr Kapil/2nd Weight Sex Delivery Anes PTL Lv   1 Current                Review of patient's allergies indicates:  No Known Allergies    Patient Active Problem List   Diagnosis    Amenorrhea    Encounter for supervision of normal pregnancy in first trimester    Rh negative state in antepartum period    Encounter for induction of labor    Mood disorder       Past Surgical History:   Procedure Laterality Date    ESOPHAGOGASTRODUODENOSCOPY N/A 2022    Procedure: EGD (ESOPHAGOGASTRODUODENOSCOPY);  Surgeon: Naun Gaitan MD;  Location: [facility];  Service: General;  Laterality: N/A;    tempanoplasty Bilateral        Tobacco Use: Low Risk  (2023)    Patient History     Smoking Tobacco Use: Never     Smokeless Tobacco Use: Never     Passive Exposure: Not on file     Alcohol Use: Not on file     Social History     Substance and Sexual Activity   Drug Use Never       OBJECTIVE:     Vital Signs:  Temp:  [36.5 °C (97.7 °F)]   Pulse:  []   Resp:  [18]   BP: (106-110)/(60-64)   SpO2:  [95 %-97 %]       Wt Readings from Last 1 Encounters:   23 1604 68.7 kg (151 lb 7.3 oz)       BP Readings from Last 3 Encounters:   23 110/60   23 106/70   23  110/68       Significant Labs    Heme Profile  Lab Results   Component Value Date    WBC 10.38 2023    HGB 13.0 2023    HCT 39.5 2023     2023       BMP  Lab Results   Component Value Date     2023    K 3.5 2023     2023    CO2 23 2023    BUN 5 (L) 2023    CREATININE 0.6 2023       Liver Function Tests  Lab Results   Component Value Date    AST 22 2023    ALT 10 2023    ALKPHOS 67 2023    BILITOT 0.4 2023    PROT 6.8 2023    ALBUMIN 4.0 2023       Endocrine Profile  Lab Results   Component Value Date    TSH 2.701 02/15/2023       ASSESSMENT/PLAN:       Pre-op Assessment    I have reviewed the Patient Summary Reports.     I have reviewed the Nursing Notes. I have reviewed the NPO Status.   I have reviewed the Medications.     Review of Systems  Anesthesia Hx:  No problems with previous Anesthesia   Neg history of prior surgery.            Denies Personal Hx of Anesthesia complications.                    Social:  Non-Smoker       Hematology/Oncology:       -- Denies Anemia:              --  Coag Disorders: Denies Bleeding Disorder:        Denies Current/Recent Cancer                Cardiovascular:      Denies Hypertension.  Denies Valvular problems/Murmurs.    Denies Dysrhythmias.    Denies CHF.                                 Pulmonary:     Denies Asthma.   Denies Shortness of breath.                  Renal/:   Denies Chronic Renal Disease.                Hepatic/GI:     GERD Denies Liver Disease.            OB/GYN/PEDS:    Planned Vaginal Delivery                1  , Para 0      Denies Issues with Current Pregnancy          Denies Neuraxial Anesthesia - Previous History              Neurological:    Denies CVA.    Denies Seizures.                                Endocrine:  Denies Diabetes.         Denies Obesity / BMI > 30  Psych:   anxiety                 Physical Exam  General: Well  nourished, Cooperative and Alert    Airway:  Mallampati: II   Mouth Opening: Normal  TM Distance: Normal  Tongue: Normal    Dental:  Intact        Anesthesia Plan  Type of Anesthesia, risks & benefits discussed:    Anesthesia Type: Epidural, Spinal, CSE, Gen ETT  Intra-op Monitoring Plan: Standard ASA Monitors  Post Op Pain Control Plan: multimodal analgesia and IV/PO Opioids PRN  Informed Consent: Informed consent signed with the Patient and all parties understand the risks and agree with anesthesia plan.  All questions answered. Patient consented to blood products? Yes  ASA Score: 2  Day of Surgery Review of History & Physical: H&P Update referred to the surgeon/provider.    Ready For Surgery From Anesthesia Perspective.     .

## 2023-11-14 NOTE — PLAN OF CARE
Elective admission,  received conscious & alert, breathing spontaneously on room air in; no obvious physical distress  Consented  PIV insitu to right arm for Tx & hydration, site WNL  Labor augmentated with PIT, same to be titrated as per orders.   Clear liquids encouraged, tolerated well.  VSS  Cat 1 tracing  Interventions and assessments as per charted.  Education, advice, reassurance and psych support on the labor management and delivery process provided.  Close obs & safety measures ensured. Management continues. Allowed to progress.

## 2023-11-14 NOTE — PROGRESS NOTES
LABOR NOTE    S:  MD to bedside for routine cervical exam. Epidural working:  yes    O: /63   Pulse 93   Temp 97.7 °F (36.5 °C) (Oral)   Resp 18   LMP 01/24/2023   SpO2 98%   Breastfeeding No     FHT: 140bpm, moderate variability, + accels, + variable decels, Cat 2 (reassuring)  CTX: q 3 minutes, pit @ 2  SVE: 6/90/-1    TIMELINE:  1800: 3/70/-3, pitocin started   2230: 4/90/-2, pitocin 12 then paused due to decel  2315: 5/90/-2, AROM clr, pitocin restarted @ 6  2345: 5/90/-2, pit paused, IUPC placed  0115: 6/90/-1, pit at 2, start amnioinfusion    PLAN:  Start amnioinfusion d/t variable decels  Continue Close Maternal/Fetal Monitoring  Pitocin Augmentation per low dose protocol  Recheck 2 hours or PRN

## 2023-11-14 NOTE — PLAN OF CARE
VSS. Patient ambulating and voiding independently and without difficulty. Fundus firm without massage, midline, with light rubra noted. Pain controlled with PRN pain medications. Safety maintained, bed low and in a locked position. Significant other at bedside. Breastfeeding every 2-3 hours with minimal assistance and in response to infant cues. No further concerns at this time, will continue to monitor.

## 2023-11-14 NOTE — PROGRESS NOTES
11/14/23 0306   [facility] Note - Strip   Strip Reviewed by [facility] Nurse? Yes   [facility] Note - Communication   [place] Nurse Communicated with Bedside Nurse Regarding: Fetal Status   [facility] Note - Notification   Nurse Notified? Yes   Name of Nurse Sulma BARRETT

## 2023-11-14 NOTE — CARE UPDATE
To bedside, FHT with deep variable decels and minimal variability. SVE 7/90/-1. Pitocin stopped, fluid bolus started. FHT to 90s with back to back ctx, maternal repositioning to hands and knees. Terb called to room. FHT recovered to baseline, terb was not given d/t improvement. Will keep pitocin off and attempt for cervical change without augmentation d/t fetal intolerance. FHT with moderate variability now, overall reassuring. Patient and partner counseled on possible indication for  delivery if persistent fetal intolerance of labor. All questions answered at this time. Will continue to monitor closely.

## 2023-11-14 NOTE — LACTATION NOTE
11/14/23 1610   Maternal Assessment   Breast Shape Bilateral:;round   Breast Density Bilateral:;soft   Areola Bilateral:;elastic   Nipples Bilateral:;everted   Maternal Infant Feeding   Maternal Emotional State assist needed   Infant Positioning clutch/football;cross-cradle   Signs of Milk Transfer audible swallow;infant jaw motion present   Latch Assistance yes     Assisted pt with breastfeeding. Baby able to latch to breast in football and cross cradle position. Good tugs and pulls observed. Basic breastfeeding education provided. Question answered. Skin to skin recommended.

## 2023-11-14 NOTE — PROGRESS NOTES
LABOR NOTE    S:  MD to bedside for routine cervical exam & AROM. Epidural working:  yes    O: /66   Pulse 75   Temp 97.7 °F (36.5 °C) (Oral)   Resp 18   LMP 01/24/2023   SpO2 100%   Breastfeeding No     FHT: 135 bpm, moderate variability, +accels, + occasional variable decels, Cat 2 (reassuring)  CTX: q 2-5 minutes, pit @ 6  SVE: 5/90/-2, AROM clr    TIMELINE:  1800: 3/70/-3, pitocin started   2230: 4/90/-2, pitocin 12 then paused due to decel  2315: 5/90/-2, AROM clr, pitocin restarted @ 6    PLAN:    Continue Close Maternal/Fetal Monitoring  Pitocin Augmentation per protocol  Recheck 4 hours or PRN    Elana Mckeon MD  OB/GYN PGY1

## 2023-11-14 NOTE — ANESTHESIA PROCEDURE NOTES
Epidural    Patient location during procedure: OB   Reason for block: primary anesthetic   Reason for block: labor analgesia requested by patient and obstetrician  Diagnosis: IUP   Start time: 11/13/2023 10:02 PM  Timeout: 11/13/2023 10:00 PM  End time: 11/13/2023 10:09 PM  Surgery related to: Vaginal Delivery    Staffing  Performing Provider: Miguel Rodriguez DO  Authorizing Provider: Gunnar Chaudhari MD    Staffing  Performed by: Miguel Rodriguez DO  Authorized by: Gunnar Chaudhari MD        Preanesthetic Checklist  Completed: patient identified, IV checked, site marked, risks and benefits discussed, surgical consent, monitors and equipment checked, pre-op evaluation, timeout performed, anesthesia consent given, hand hygiene performed and patient being monitored  Preparation  Patient position: sitting  Prep: ChloraPrep  Patient monitoring: Pulse Ox  Reason for block: primary anesthetic   Epidural  Skin Anesthetic: lidocaine 1%  Skin Wheal: 3 mL  Administration type: continuous  Approach: midline  Interspace: L3-4    Injection technique: DIAN air  Needle and Epidural Catheter  Needle type: Tuohy   Needle gauge: 17  Needle length: 3.5 inches  Needle insertion depth: 6 cm  Catheter type: Kromatid  Catheter size: 19 G  Catheter at skin depth: 10 cm  Insertion Attempts: 1  Test dose: 3 mL of lidocaine 1.5% with Epi 1-to-200,000  Additional Documentation: incremental injection, negative aspiration for heme and CSF, no paresthesia on injection, no signs/symptoms of IV or SA injection, no significant pain on injection and no significant complaints from patient  Needle localization: anatomical landmarks  Medications:  Volume per aspiration: 5 mL  Time between aspirations: 5 minutes   Assessment  Ease of block: easy  Patient's tolerance of the procedure: comfortable throughout block and no complaints No inadvertent dural puncture with Tuohy.  Dural puncture performed with spinal needle.

## 2023-11-14 NOTE — L&D DELIVERY NOTE
[facility] - Labor & Delivery  Vaginal Delivery   Operative Note    SUMMARY     Vertex presentation.   Patient was under epidural anesthesia.  After approximately 15 minutes of maternal pushing, decision made to proceed with operative delivery due to continued prolonged  decelerations to the 80s. Verbal consent was obtained. Patient was counseled on r/b/a. Charge RN, NICU, and anesthesia made aware. OB staff present. Decision made to attempt operative delivery in labor room.    Patient's pelvis thought to be adequate and appropriate for operative delivery.     Indication for operative delivery: non reassuring fetal status.    Fetal position was confirmed PHI and vacuum applied to fetal head. Position and correct application again reconfirmed. 3 number of pulls with 3 sets of contractions with 1 pop off were performed with fetal head descent. Right mediolateral episiotomy was performed to allow room for clearance of Kiwi Vacuum.  Once fetal head at perineum,  vacuum removed. Infant delivered OA.    Nuchal x1 reduced at introitus.  Female infant also tolerated the delivery well and was placed on mothers abdomen for skin to skin and bulb suctioning performed.  Cord clamped and cut.  Umbilical arterial gas and venous blood obtained.    Gentle traction on the umbilical cord yielded delivery of the placenta, and IV pitocin was started.   Bimanual uterine massage revealed uterine atony for with IM Methergine and IN Cytotec were administered  Second degree laceration was repaired with 2-0 and 3-0 Vicryl in the usual fashion and was then found to be hemostatic.  Uterine tone noted again.   Patient and infant tolerated delivery well.   mL.  Dr. Gloria Castillo was present for the entire procedure.   S/L/N counts correct x 2.    Katherine Boecking MD   Ob/Gyn PGY-4        Indications: Vacuum-assisted vaginal delivery  Pregnancy complicated by:   Patient Active Problem List   Diagnosis    Amenorrhea    Encounter for  "supervision of normal pregnancy in first trimester    Rh negative state in antepartum period    Mood disorder    Vacuum-assisted vaginal delivery     Admitting GA: 39w2d    Delivery Information for Verónica Killian    Birth information:  YOB: 2024   Time of birth: 3:35 AM   Sex: female   Head Delivery Date/Time: 2023  3:35 AM   Delivery type: Vaginal, Vacuum (Extractor)   Gestational Age: 39w2d        Delivery Providers    Delivering clinician: Gloria Castillo MD   Provider Role    Boecking, Katherine C, MD Resident    Beba Hyatt RN Registered Nurse    Kavita Abel RN Registered Nurse    Sulma Everett RN Registered Nurse    Suhas Banuelos ST Scrub Person    Elana Mckeon MD Resident              Measurements    Weight: 2590 g  Weight (lbs): 5 lb 11.4 oz  Length: 48.3 cm  Length (in): 19"  Head circumference: 30.3 cm  Chest circumference: 32 cm         Apgars    Living status: Living  Apgar Component Scores:  1 min.:  5 min.:  10 min.:  15 min.:  20 min.:    Skin color:  0  1       Heart rate:  2  2       Reflex irritability:  1  1       Muscle tone:  1  2       Respiratory effort:  2  2       Total:  6  8       Apgars assigned by: NICU         Operative Delivery    Forceps attempted?: No  Vacuum extractor attempted?: Yes  Vacuum indications: Fetal Heart Rate or Rhythm Abnormality  Vacuum type: Kiwi  Vacuum application location: Mid  Number of pop offs: 1  Number of pulls with vacuum: 3  Total vacuum application time: 7 minutes  Vacuum applied by: K. BOECKING         Shoulder Dystocia    Shoulder dystocia present?: No           Presentation    Presentation: Vertex  Position: Middle Occiput Anterior           Interventions/Resuscitation    Method: NICU Attended       Cord    Vessels: 3 vessels  Complications: Nuchal  Nuchal Intervention: reduced  Nuchal Cord Description: loose nuchal cord  Number of Loops: 1  Delayed Cord Clamping?: No  Cord Clamped Date/Time: " 2023  3:36 AM  Cord Blood Disposition: Sent with Baby  Gases Sent?: Yes  Stem Cell Collection (by MD): No       Placenta    Placenta delivery date/time: 2023 0340  Placenta removal: Spontaneous  Placenta appearance: Intact  Placenta disposition: Discarded           Labor Events:       labor: No     Labor Onset Date/Time:         Dilation Complete Date/Time:         Start Pushing Date/Time:         Start Pushing Date/Time:       Rupture Date/Time: 23         Rupture type: ARM (Artificial Rupture)         Fluid Amount:       Fluid Color: Clear               steroids:       Antibiotics given for GBS: No     Induction: oxytocin     Indications for induction:  Elective     Augmentation: amniotomy;oxytocin     Indications for augmentation:       Labor complications: Fetal Intolerance;Dysfunctional Labor     Additional complications:          Cervical ripening:                     Delivery:      Episiotomy: Right Mediolateral     Indication for Episiotomy: Instrumented Delivery     Perineal Lacerations: 2nd Repaired:  Yes   Periurethral Laceration:   Repaired:     Labial Laceration:   Repaired:     Sulcus Laceration:   Repaired:     Vaginal Laceration:   Repaired:     Cervical Laceration:   Repaired:     Repair suture:       Repair # of packets: 3     Last Value - EBL - Nursing (mL):       Sum - EBL - Nursing (mL): 0     Last Value - EBL - Anesthesia (mL):      Calculated QBL (mL):       Vaginal Sweep Performed: Yes     Surgicount Correct: Yes     Vaginal Packing: No Quantity:       Other providers:       Anesthesia    Method: Epidural          Details (if applicable):  Trial of Labor      Categorization:      Priority:     Indications for :     Incision Type:       Additional  information:  Forceps:    Vacuum:    Breech:    Observed anomalies    Other (Comments):

## 2023-11-14 NOTE — CARE UPDATE
Resident to bedside for recurrent variable decels to 70s after epidural placement. Pitocin @ 12, halved to 6, then paused. FHT recovered with maternal repositioning, fluid bolus, and pitocin pause. Cervical exam 4/90/-2, BBOW. Plan to restart pitocin at 6 after 30 minute pause. Will AROM at that time.     Elana Mckeon MD  OB/GYN PGY1

## 2023-11-15 PROBLEM — K21.9 GERD (GASTROESOPHAGEAL REFLUX DISEASE): Status: ACTIVE | Noted: 2023-11-15

## 2023-11-15 LAB
BASOPHILS # BLD AUTO: 0.07 K/UL (ref 0–0.2)
BASOPHILS NFR BLD: 0.6 % (ref 0–1.9)
DIFFERENTIAL METHOD: ABNORMAL
EOSINOPHIL # BLD AUTO: 0.1 K/UL (ref 0–0.5)
EOSINOPHIL NFR BLD: 1.1 % (ref 0–8)
ERYTHROCYTE [DISTWIDTH] IN BLOOD BY AUTOMATED COUNT: 12.8 % (ref 11.5–14.5)
HCT VFR BLD AUTO: 33 % (ref 37–48.5)
HGB BLD-MCNC: 10.6 G/DL (ref 12–16)
IMM GRANULOCYTES # BLD AUTO: 0.07 K/UL (ref 0–0.04)
IMM GRANULOCYTES NFR BLD AUTO: 0.6 % (ref 0–0.5)
INJECT RH IG VOL PATIENT: NORMAL ML
LYMPHOCYTES # BLD AUTO: 1.9 K/UL (ref 1–4.8)
LYMPHOCYTES NFR BLD: 16.4 % (ref 18–48)
MCH RBC QN AUTO: 29.2 PG (ref 27–31)
MCHC RBC AUTO-ENTMCNC: 32.1 G/DL (ref 32–36)
MCV RBC AUTO: 91 FL (ref 82–98)
MONOCYTES # BLD AUTO: 0.7 K/UL (ref 0.3–1)
MONOCYTES NFR BLD: 6.3 % (ref 4–15)
NEUTROPHILS # BLD AUTO: 8.6 K/UL (ref 1.8–7.7)
NEUTROPHILS NFR BLD: 75 % (ref 38–73)
NRBC BLD-RTO: 0 /100 WBC
PLATELET # BLD AUTO: 161 K/UL (ref 150–450)
PMV BLD AUTO: 10.6 FL (ref 9.2–12.9)
RBC # BLD AUTO: 3.63 M/UL (ref 4–5.4)
WBC # BLD AUTO: 11.42 K/UL (ref 3.9–12.7)

## 2023-11-15 PROCEDURE — 25000003 PHARM REV CODE 250: Performed by: STUDENT IN AN ORGANIZED HEALTH CARE EDUCATION/TRAINING PROGRAM

## 2023-11-15 PROCEDURE — 25000003 PHARM REV CODE 250

## 2023-11-15 PROCEDURE — 85025 COMPLETE CBC W/AUTO DIFF WBC: CPT | Performed by: STUDENT IN AN ORGANIZED HEALTH CARE EDUCATION/TRAINING PROGRAM

## 2023-11-15 PROCEDURE — 36415 COLL VENOUS BLD VENIPUNCTURE: CPT | Performed by: STUDENT IN AN ORGANIZED HEALTH CARE EDUCATION/TRAINING PROGRAM

## 2023-11-15 PROCEDURE — 99024 PR POST-OP FOLLOW-UP VISIT: ICD-10-PCS | Mod: ,,, | Performed by: OBSTETRICS & GYNECOLOGY

## 2023-11-15 PROCEDURE — 63600519 RHOGAM PHARM REV CODE 636 ALT 250 W HCPCS

## 2023-11-15 PROCEDURE — 99024 POSTOP FOLLOW-UP VISIT: CPT | Mod: ,,, | Performed by: OBSTETRICS & GYNECOLOGY

## 2023-11-15 PROCEDURE — 11000001 HC ACUTE MED/SURG PRIVATE ROOM

## 2023-11-15 RX ADMIN — PANTOPRAZOLE SODIUM 40 MG: 40 TABLET, DELAYED RELEASE ORAL at 07:11

## 2023-11-15 RX ADMIN — HUMAN RHO(D) IMMUNE GLOBULIN 300 MCG: 300 INJECTION, SOLUTION INTRAMUSCULAR at 04:11

## 2023-11-15 RX ADMIN — IBUPROFEN 600 MG: 600 TABLET, FILM COATED ORAL at 01:11

## 2023-11-15 RX ADMIN — IBUPROFEN 600 MG: 600 TABLET, FILM COATED ORAL at 07:11

## 2023-11-15 RX ADMIN — DOCUSATE SODIUM 200 MG: 100 CAPSULE, LIQUID FILLED ORAL at 10:11

## 2023-11-15 RX ADMIN — PRENATAL VIT W/ FE FUMARATE-FA TAB 27-0.8 MG 1 TABLET: 27-0.8 TAB at 07:11

## 2023-11-15 RX ADMIN — DOCUSATE SODIUM 200 MG: 100 CAPSULE, LIQUID FILLED ORAL at 07:11

## 2023-11-15 RX ADMIN — IBUPROFEN 600 MG: 600 TABLET, FILM COATED ORAL at 06:11

## 2023-11-15 RX ADMIN — FLUOXETINE 40 MG: 20 CAPSULE ORAL at 10:11

## 2023-11-15 NOTE — PLAN OF CARE
Discharge teaching provided, patient verbalized understanding. VSS. Patient ambulating and voiding independently and without difficulty. Fundus firm without massage, midline, with light rubra noted. Incision site intact with no signs or symptoms of infection Pain controlled without PRN pain medications. Breastfeeding every 2-3 hours with minimal assistance and in response to infant cues. No further concerns at this time.

## 2023-11-15 NOTE — ANESTHESIA POSTPROCEDURE EVALUATION
Anesthesia Post Evaluation    Patient: Opal Killian    Procedure(s) Performed: * No procedures listed *    Final Anesthesia Type: epidural      Patient location during evaluation: floor  Patient participation: Yes- Able to Participate  Level of consciousness: awake and alert  Post-procedure vital signs: reviewed and stable  Pain management: adequate  Airway patency: patent  SIMBA mitigation strategies: Multimodal analgesia and Use of major conduction anesthesia (spinal/epidural) or peripheral nerve block  PONV status at discharge: No PONV  Anesthetic complications: no      Cardiovascular status: hemodynamically stable  Respiratory status: room air  Hydration status: euvolemic  Follow-up not needed.          Vitals Value Taken Time   /56 11/15/23 0858   Temp 36.3 °C (97.4 °F) 11/15/23 0858   Pulse 79 11/15/23 0858   Resp 18 11/15/23 0858   SpO2 100 % 11/15/23 0858         No case tracking events are documented in the log.      Pain/Keshawn Score: Pain Rating Prior to Med Admin: 3 (11/15/2023  7:35 AM)  Pain Rating Post Med Admin: 0 (11/15/2023  2:08 AM)

## 2023-11-15 NOTE — PROGRESS NOTES
"POSTPARTUM PROGRESS NOTE    Subjective:     PPD#: 1   Procedure: Vacuum-assisted vaginal delivery   EGA: 39w2d   N/V: No   F/C: No   Abd Pain: Mild, well-controlled with oral pain medication   Lochia: Mild   Voiding: Yes   Ambulating: Yes   Bowel fnc: Yes   Contraception: Int IUD     Objective:      Temp:  [97.8 °F (36.6 °C)-98.2 °F (36.8 °C)] 97.8 °F (36.6 °C)  Pulse:  [58-84] 58  Resp:  [18] 18  SpO2:  [97 %-98 %] 97 %  BP: (105-120)/(56-59) 105/59    Abdomen: Soft, appropriately tender   Uterus: Firm, no fundal tenderness   Incision: N/A     Lab Review    No results for input(s): "NA", "K", "CL", "CO2", "BUN", "CREATININE", "GLU", "PROT", "BILITOT", "ALKPHOS", "ALT", "AST", "MG", "PHOS" in the last 168 hours.    Recent Labs   Lab 11/13/23  1758 11/14/23  0358 11/14/23  0359 11/15/23  0441   WBC 10.38  --   --  11.42   HGB 13.0  --   --  10.6*   HCT 39.5 44 43 33.0*   MCV 87  --   --  91     --   --  161           I/O    Intake/Output Summary (Last 24 hours) at 11/15/2023 0628  Last data filed at 11/14/2023 1401  Gross per 24 hour   Intake --   Output 2150 ml   Net -2150 ml          Assessment and Plan:   Postpartum care:  - Patient doing well.  - Continue routine management and advances.    Rh negative  - Baby: A Pos  - Rhogam before d/c    Mood Disorder  - Mood stable  - Medications: fluoxetine   - Will need 1-2 week postpartum mood check    Alcira Hester MD  Obstetrics and Gynecology, PGY-1          "

## 2023-11-16 VITALS
OXYGEN SATURATION: 98 % | DIASTOLIC BLOOD PRESSURE: 64 MMHG | RESPIRATION RATE: 18 BRPM | SYSTOLIC BLOOD PRESSURE: 107 MMHG | HEART RATE: 86 BPM | TEMPERATURE: 98 F

## 2023-11-16 PROCEDURE — 99024 POSTOP FOLLOW-UP VISIT: CPT | Mod: ,,, | Performed by: OBSTETRICS & GYNECOLOGY

## 2023-11-16 PROCEDURE — 25000003 PHARM REV CODE 250: Performed by: STUDENT IN AN ORGANIZED HEALTH CARE EDUCATION/TRAINING PROGRAM

## 2023-11-16 PROCEDURE — 99024 PR POST-OP FOLLOW-UP VISIT: ICD-10-PCS | Mod: ,,, | Performed by: OBSTETRICS & GYNECOLOGY

## 2023-11-16 PROCEDURE — 25000003 PHARM REV CODE 250

## 2023-11-16 RX ADMIN — IBUPROFEN 600 MG: 600 TABLET, FILM COATED ORAL at 01:11

## 2023-11-16 RX ADMIN — IBUPROFEN 600 MG: 600 TABLET, FILM COATED ORAL at 10:11

## 2023-11-16 RX ADMIN — PRENATAL VIT W/ FE FUMARATE-FA TAB 27-0.8 MG 1 TABLET: 27-0.8 TAB at 10:11

## 2023-11-16 RX ADMIN — PANTOPRAZOLE SODIUM 40 MG: 40 TABLET, DELAYED RELEASE ORAL at 10:11

## 2023-11-16 NOTE — DISCHARGE SUMMARY
Delivery Discharge Summary  Obstetrics      Primary OB Clinician: Lily Pineda MD     Admission date: 2023  Discharge date: 2023    Disposition: To home, self care    Discharge Diagnosis List:  Patient Active Problem List   Diagnosis    Amenorrhea    Encounter for supervision of normal pregnancy in first trimester    Rh negative state in antepartum period    Mood disorder    Vacuum-assisted vaginal delivery    GERD (gastroesophageal reflux disease)     Procedure: Vacuum extraction     Hospital Course:  Opal Killian is a 28 y.o. now , PPD #2 who was admitted on 2023 at 39w1d for IOL. IUP complicated by Rh neg status, mood disorder. Patient was subsequently admitted to labor and delivery unit with signed consents.     Labor course was complicated by NRFHT and resulted in VAVD w/ RML episiotomy without complications.     Please see delivery note for further details. Her postpartum course was uncomplicated. On discharge day, patient's pain is controlled with oral pain medications. Pt is tolerating ambulation without SOB or CP, and regular diet without N/V. Reports lochia is mild. Denies any HA, vision changes, F/C, LE swelling. Denies any breast pain/soreness.    Pt in stable condition and ready for discharge. She has been instructed to start and/or continue medications and follow up with her obstetrics provider as listed below.    Temp:  [97.4 °F (36.3 °C)-97.9 °F (36.6 °C)] 97.8 °F (36.6 °C)  Pulse:  [70-82] 70  Resp:  [17-18] 17  SpO2:  [99 %-100 %] 99 %  BP: (100-108)/(55-58) 101/55    Abdomen: Soft, appropriately tender   Uterus: Firm, no fundal tenderness   Incision: Bandage in place without shadowing     Pertinent studies:  CBC  Recent Labs   Lab 23  1758 23  0358 23  0359 11/15/23  0441   WBC 10.38  --   --  11.42   HGB 13.0  --   --  10.6*   HCT 39.5 44 43 33.0*   MCV 87  --   --  91     --   --  161      Immunization History   Administered  "Date(s) Administered    COVID-19, MRNA, LN-S, PF (MODERNA FULL 0.5 ML DOSE) 2021, 2021    Rho (D) Immune Globulin 2023    Rho (D) Immune Globulin - IM 11/15/2023    Tdap 2023      Delivery:    Episiotomy: Right Mediolateral   Lacerations: 2nd   Repair suture:     Repair # of packets: 3   Blood loss (ml):       Birth information:  YOB: 2024   Time of birth: 3:35 AM   Sex: female   Delivery type: Vaginal, Vacuum (Extractor)   Gestational Age: 39w2d     Measurements    Weight: 2590 g  Weight (lbs): 5 lb 11.4 oz  Length: 48.3 cm  Length (in): 19"  Head circumference: 30.3 cm  Chest circumference: 32 cm         Delivery Clinician: Delivery Providers    Delivering clinician: Gloria Castillo MD   Provider Role    Boecking, Katherine C, MD Resident    Beba Hyatt RN Registered Nurse    Kavita Abel RN Registered Nurse    Sulma Everett RN Registered Nurse    Suhas Banuelos ST Scrub Person    Elana Mckeon MD Resident             Additional  information:  Forceps:    Vacuum:    Breech:    Observed anomalies      Living?:     Apgars    Living status: Living  Apgar Component Scores:  1 min.:  5 min.:  10 min.:  15 min.:  20 min.:    Skin color:  0  1       Heart rate:  2  2       Reflex irritability:  1  1       Muscle tone:  1  2       Respiratory effort:  2  2       Total:  6  8       Apgars assigned by: NICU         Placenta: Delivered:       appearance    Patient Instructions:   Current Discharge Medication List        START taking these medications    Details   ibuprofen (ADVIL,MOTRIN) 600 MG tablet Take 1 tablet (600 mg total) by mouth every 6 (six) hours as needed for Pain.  Qty: 30 tablet, Refills: 1      polyethylene glycol (GLYCOLAX) 17 gram/dose powder Take 17 g by mouth once daily.  Qty: 510 g, Refills: 0           CONTINUE these medications which have NOT CHANGED    Details   FLUoxetine 40 MG capsule Take 1 capsule (40 mg total) by mouth once " daily.  Qty: 30 capsule, Refills: 11    Associated Diagnoses: Eating disorder, unspecified type; Anxiety      prenatal vit no.124/iron/folic (PRENATAL VITAMIN ORAL)            STOP taking these medications       lansoprazole (PREVACID) 30 MG capsule Comments:   Reason for Stopping:         ondansetron (ZOFRAN-ODT) 4 MG TbDL Comments:   Reason for Stopping:             Discharge Procedure Orders   Diet Adult Regular     Lifting restrictions   Order Comments: No lifting anything larger than baby for 6 weeks     No driving until:   Order Comments: No driving while taking narcotics     Pelvic Rest   Order Comments: Until seen in clinic     Notify your health care provider if you experience any of the following:  temperature >100.4     Notify your health care provider if you experience any of the following:  persistent nausea and vomiting or diarrhea     Notify your health care provider if you experience any of the following:  severe uncontrolled pain     Notify your health care provider if you experience any of the following:  redness, tenderness, or signs of infection (pain, swelling, redness, odor or green/yellow discharge around incision site)     Notify your health care provider if you experience any of the following:  difficulty breathing or increased cough     Notify your health care provider if you experience any of the following:  severe persistent headache     Notify your health care provider if you experience any of the following:  persistent dizziness, light-headedness, or visual disturbances     Activity as tolerated        Follow-up Information       Lily Pineda MD Follow up in 6 week(s).    Specialty: Obstetrics and Gynecology  Why: Post partum check  Contact information:  [address] [address]  [phone]               Lily Pineda MD Follow up in 2 week(s).    Specialty: Obstetrics and Gynecology  Why: pp mood check  Contact information:  [address] [facility]  [address]  [facility] [address]  [phone]                              Tanvir Mariee MD MS  OB/Gyn  PGY-1

## 2023-11-16 NOTE — LACTATION NOTE
Discharge lactation education provided.questions  answered.pt has lactation contact number and community resources.

## 2023-11-16 NOTE — PHYSICIAN QUERY
PT Name: Opal Killian  MR #: [id number]     DOCUMENTATION CLARIFICATION     CDS/: ARIEL Rebolledo,RNC-MNN        Contact information:[email or web address]  This form is a permanent document in the medical record.    Query Date: November 16, 2023  By submitting this query, we are merely seeking further clarification of documentation. Please utilize your independent clinical judgment when addressing the question(s) below.      Indicators Supporting Clinical Findings Location in Medical Record   X Documentation of uterine atony with bleeding, postpartum bleeding, or hemorrhage Bimanual uterine massage revealed uterine atony   L&D Delivery note 11/14    Documentation of retained placenta     X Delivery type with EBL or QBL  Vacuum-assisted vaginal delivery      mL  L&D Delivery note 11/14   X H/H Recent Labs   Lab 11/13/23  1758 11/14/23  0358 11/14/23  0359 11/15/23  0441   WBC 10.38  --   --  11.42   HGB 13.0  --   --  10.6*   HCT 39.5 44 43 33.0*   MCV 87  --   --  91     --   --  161     OB Progress note 11/15@246pm    Vital signs     X Medications, Treatment IM Methergine and SC Cytotec were administered  L&D Delivery note 11/14    Blood transfusion     X Other Acute blood loss anemia: Continue iron  OB Progress note 11/15@246pm      Provider, please specify the diagnosis or diagnoses associated with the above clinical findings:      [   ] Postpartum uterine atony with hemorrhage   [x   ] Postpartum uterine atony without hemorrhage   [   ] Other hematological diagnosis (please specify): _________________     Please document in your progress notes daily for the duration of treatment, until resolved, and include in your discharge summary.  Form [id number]

## 2023-11-17 ENCOUNTER — PATIENT MESSAGE (OUTPATIENT)
Dept: OBSTETRICS AND GYNECOLOGY | Facility: OTHER | Age: 28
End: 2023-11-17
Payer: COMMERCIAL

## 2023-11-28 ENCOUNTER — OFFICE VISIT (OUTPATIENT)
Dept: OBSTETRICS AND GYNECOLOGY | Facility: CLINIC | Age: 28
End: 2023-11-28
Payer: COMMERCIAL

## 2023-11-28 PROCEDURE — 0502F PR SUBSEQUENT PRENATAL CARE: ICD-10-PCS | Mod: 95,,, | Performed by: STUDENT IN AN ORGANIZED HEALTH CARE EDUCATION/TRAINING PROGRAM

## 2023-11-28 PROCEDURE — 0502F SUBSEQUENT PRENATAL CARE: CPT | Mod: 95,,, | Performed by: STUDENT IN AN ORGANIZED HEALTH CARE EDUCATION/TRAINING PROGRAM

## 2023-11-28 NOTE — PROGRESS NOTES
The patient location is:  Patient Home   The chief complaint leading to consultation is: mood check  Visit type: Virtual visit with synchronous audio and video  Total time spent with patient: 10 min  Each patient to whom he or she provides medical services by telemedicine is:  (1) informed of the relationship between the physician and patient and the respective role of any other health care provider with respect to management of the patient; and (2) notified that he or she may decline to receive medical services by telemedicine and may withdraw from such care at any time.      Pt checking in postpartum for mod check  Doing well  Pain well controlled, little Rx needed  Normal bowels and urination  Breastfeeding going well  Mood is good overall -definitely more perez when getting less sleep, but getting better with time    Reviewed normal expectations/precautions and local resources    Rtc for routine pp visit mili Pineda M.D.

## 2023-11-29 ENCOUNTER — PATIENT MESSAGE (OUTPATIENT)
Dept: OBSTETRICS AND GYNECOLOGY | Facility: CLINIC | Age: 28
End: 2023-11-29
Payer: COMMERCIAL

## 2023-11-29 RX ORDER — LANSOPRAZOLE 30 MG/1
30 CAPSULE, DELAYED RELEASE ORAL DAILY
Qty: 90 CAPSULE | Refills: 3 | Status: SHIPPED | OUTPATIENT
Start: 2023-11-29 | End: 2024-11-28

## 2023-12-29 ENCOUNTER — PATIENT MESSAGE (OUTPATIENT)
Dept: OBSTETRICS AND GYNECOLOGY | Facility: CLINIC | Age: 28
End: 2023-12-29
Payer: COMMERCIAL

## 2024-01-02 DIAGNOSIS — Z30.09 ENCOUNTER FOR GENERAL COUNSELING AND ADVICE ON CONTRACEPTIVE MANAGEMENT: ICD-10-CM

## 2024-01-22 ENCOUNTER — PATIENT MESSAGE (OUTPATIENT)
Dept: OBSTETRICS AND GYNECOLOGY | Facility: CLINIC | Age: 29
End: 2024-01-22
Payer: COMMERCIAL

## 2024-01-22 ENCOUNTER — POSTPARTUM VISIT (OUTPATIENT)
Dept: OBSTETRICS AND GYNECOLOGY | Facility: CLINIC | Age: 29
End: 2024-01-22
Attending: STUDENT IN AN ORGANIZED HEALTH CARE EDUCATION/TRAINING PROGRAM
Payer: COMMERCIAL

## 2024-01-22 VITALS
WEIGHT: 135.81 LBS | BODY MASS INDEX: 21.31 KG/M2 | SYSTOLIC BLOOD PRESSURE: 114 MMHG | HEIGHT: 67 IN | DIASTOLIC BLOOD PRESSURE: 62 MMHG

## 2024-01-22 DIAGNOSIS — Z30.430 ENCOUNTER FOR IUD INSERTION: ICD-10-CM

## 2024-01-22 PROCEDURE — 58300 INSERT INTRAUTERINE DEVICE: CPT | Mod: S$GLB,,, | Performed by: STUDENT IN AN ORGANIZED HEALTH CARE EDUCATION/TRAINING PROGRAM

## 2024-01-22 PROCEDURE — 0503F POSTPARTUM CARE VISIT: CPT | Mod: S$GLB,,, | Performed by: STUDENT IN AN ORGANIZED HEALTH CARE EDUCATION/TRAINING PROGRAM

## 2024-01-22 NOTE — PROGRESS NOTES
History & Physical  Gynecology      SUBJECTIVE:     Chief Complaint: No chief complaint on file.       History of Present Illness:    Opal Killian is here for her postaprtum visit after  delivery on . Delivery and postpartum course was uncomplicated. Pregnancy was complicated by rh negative status, failed 1hr gtt. She is feeling well today. She denies bleeding, pain, F/C, N/V. Normal bowel and bladder function. Breastfeeding is going well, but baby is still getting fussy sometimes at mid feed -bottle or breast. Has not had sex since the delivery. She denies si/sx of PPD.  Baby is doing well. Contraceptive plans mirena. Pap utd.      Review of patient's allergies indicates:  No Known Allergies    Past Medical History:   Diagnosis Date    Anxiety disorder, unspecified     Chronic GERD      Past Surgical History:   Procedure Laterality Date    ESOPHAGOGASTRODUODENOSCOPY N/A 2022    Procedure: EGD (ESOPHAGOGASTRODUODENOSCOPY);  Surgeon: Naun Gaitan MD;  Location: [facility];  Service: General;  Laterality: N/A;    tempanoplasty Bilateral      OB History          1    Para   1    Term   1       0    AB   0    Living   1         SAB   0    IAB   0    Ectopic   0    Multiple   0    Live Births   1               No family history on file.  Social History     Tobacco Use    Smoking status: Never    Smokeless tobacco: Never   Substance Use Topics    Alcohol use: Not Currently     Comment: social    Drug use: Never       Current Outpatient Medications   Medication Sig    FLUoxetine 40 MG capsule Take 1 capsule (40 mg total) by mouth once daily.    ibuprofen (ADVIL,MOTRIN) 600 MG tablet Take 1 tablet (600 mg total) by mouth every 6 (six) hours as needed for Pain.    lansoprazole (PREVACID) 30 MG capsule Take 1 capsule (30 mg total) by mouth once daily.    polyethylene glycol (GLYCOLAX) 17 gram/dose powder Take 17 g by mouth once daily.    prenatal vit no.124/iron/folic (PRENATAL VITAMIN  ORAL)      No current facility-administered medications for this visit.         Review of Systems:  Review of Systems   Constitutional:  Negative for activity change, appetite change, chills and fever.   Respiratory:  Negative for shortness of breath.    Cardiovascular:  Negative for chest pain.   Gastrointestinal:  Negative for abdominal pain, blood in stool, constipation, diarrhea, nausea and vomiting.   Endocrine: Negative for diabetes.   Genitourinary:  Negative for dysuria, hematuria, pelvic pain, vaginal bleeding, vaginal discharge and vaginal pain.   Integumentary:  Negative for breast mass.   Neurological:  Negative for headaches.   Psychiatric/Behavioral:  Negative for depression. The patient is not nervous/anxious.    Breast: Negative for mass and mastodynia       OBJECTIVE:     Physical Exam:  Physical Exam  Vitals reviewed.   Constitutional:       General: She is not in acute distress.     Appearance: She is well-developed. She is not diaphoretic.   HENT:      Head: Normocephalic and atraumatic.   Eyes:      Conjunctiva/sclera: Conjunctivae normal.   Cardiovascular:      Rate and Rhythm: Normal rate.   Pulmonary:      Effort: Pulmonary effort is normal.   Abdominal:      General: There is no distension.      Palpations: Abdomen is soft. There is no mass.      Tenderness: There is no abdominal tenderness. There is no guarding or rebound.   Genitourinary:     Labia:         Right: No rash, tenderness, lesion or injury.         Left: No rash, tenderness, lesion or injury.       Vagina: No signs of injury and foreign body. No vaginal discharge, erythema, tenderness or bleeding.      Cervix: No cervical motion tenderness, discharge or friability.      Uterus: Not deviated, not enlarged, not fixed and not tender.       Adnexa:         Right: No mass, tenderness or fullness.          Left: No mass, tenderness or fullness.     Musculoskeletal:         General: Normal range of motion.      Cervical back: Normal  range of motion.   Skin:     General: Skin is warm and dry.   Neurological:      Mental Status: She is alert.           ASSESSMENT:       ICD-10-CM ICD-9-CM    1.  (spontaneous vaginal delivery)  O80 650 Insertion of Intrauterine Device      2. Encounter for IUD insertion  Z30.430 V25.11 Insertion of Intrauterine Device             Plan:      Assessment and Plan:  Pt is doing well postpartum. No complaints.  Laceration healing well  PPD screen negative  T2DM screen not indicated  Pap utd  Contraceptive plans: mirena placed. See procedure note  Pt is cleared for all activity    RTC for annual or PRN   Counseling time: 30 minutes    Lily Pineda

## 2024-01-22 NOTE — PROCEDURES
Insertion of IUD    Date/Time: 1/22/2024 4:15 PM    Performed by: Lily Pineda MD  Authorized by: Lily Pineda MD    Consent:     Consent obtained:  Prior to procedure the appropriate consent was completed and verified    Consent given by:  Patient    Procedure risks and benefits discussed: yes      Patient questions answered: yes      Patient agrees, verbalizes understanding, and wants to proceed: yes     Device to be inserted was verified by patient: yes    Educational handouts given: yes      Instructions and paperwork completed: yes    Insertion Procedure:   1 Intra Uterine Device levonorgestreL 21 mcg/24 hours (8 yrs) 52 mg       Negative urine pregnancy test: yes      Cervix cleaned and prepped: yes      Speculum placed in vagina: yes      Tenaculum applied to cervix: yes      Uterus sounded: yes      Uterus sound depth (cm):  7    IUD inserted with no complications: yes      IUD type:  Mirena    Strings trimmed: yes    Post-procedure:     Patient tolerated procedure well: yes    Comments:      [id number]  Exp 9/2025

## 2024-01-22 NOTE — PATIENT INSTRUCTIONS
Dr. Saldivar  Address: [address], [place] [address]  Phone: [phone]    Local support: [facility] [facility] [address]. [email or web address] [phone] (ext [phone])    Local [place] Support:  Snuggles and struggles:  Tuesdays 10:30a T  [email or web address]  Mom to Mom:    Wednesdays 10:30a    [email or web address]  Beyond the Blues:   Mondays 2:00p     [employer]  Cafe Au Lait:    Times vary     [employer]OREing[employer].Designer Pages Online  Baby Bistreaux:   Mondays & Fridays 9a-12p [employer].Designer Pages Online  Baby Cafe:    Wednesdays 12:00p    [employer] Learning[employer] Online    Get education and online support at postpartum.net  Talk @ [phone]   Text @ [phone]

## 2024-02-22 ENCOUNTER — OFFICE VISIT (OUTPATIENT)
Dept: PODIATRY | Facility: CLINIC | Age: 29
End: 2024-02-22
Payer: COMMERCIAL

## 2024-02-22 VITALS
DIASTOLIC BLOOD PRESSURE: 71 MMHG | HEIGHT: 67 IN | BODY MASS INDEX: 21.42 KG/M2 | SYSTOLIC BLOOD PRESSURE: 107 MMHG | TEMPERATURE: 98 F | RESPIRATION RATE: 18 BRPM | HEART RATE: 70 BPM | WEIGHT: 136.44 LBS | OXYGEN SATURATION: 100 %

## 2024-02-22 DIAGNOSIS — M79.674 PAIN OF RIGHT GREAT TOE: ICD-10-CM

## 2024-02-22 DIAGNOSIS — L60.0 INGROWN NAIL: Primary | ICD-10-CM

## 2024-02-22 PROCEDURE — 11750 EXCISION NAIL&NAIL MATRIX: CPT | Mod: T5,S$GLB,, | Performed by: STUDENT IN AN ORGANIZED HEALTH CARE EDUCATION/TRAINING PROGRAM

## 2024-02-22 PROCEDURE — 99999 PR PBB SHADOW E&M-EST. PATIENT-LVL III: CPT | Mod: PBBFAC,,, | Performed by: STUDENT IN AN ORGANIZED HEALTH CARE EDUCATION/TRAINING PROGRAM

## 2024-02-22 PROCEDURE — 99203 OFFICE O/P NEW LOW 30 MIN: CPT | Mod: 25,S$GLB,, | Performed by: STUDENT IN AN ORGANIZED HEALTH CARE EDUCATION/TRAINING PROGRAM

## 2024-02-22 NOTE — PROGRESS NOTES
Subjective:     Patient    Opal Killian is a 29 y.o. female.    Problem    New to [facility]. Presents for right 1st ingrown nail, pain at both medial and lateral borders. Has been a chronic issue but current episode has been ongoing for several weeks. No signs of infection at this time. 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      History    History obtained from patient and review of medical records.     Past Medical History:   Diagnosis Date    Anxiety disorder, unspecified     Chronic GERD        Past Surgical History:   Procedure Laterality Date    ESOPHAGOGASTRODUODENOSCOPY N/A 8/12/2022    Procedure: EGD (ESOPHAGOGASTRODUODENOSCOPY);  Surgeon: Naun Gaitan MD;  Location: [facility];  Service: General;  Laterality: N/A;    tempanoplasty Bilateral         Objective:     Vitals  Wt Readings from Last 1 Encounters:   02/22/24 61.9 kg (136 lb 7.4 oz)     Temp Readings from Last 1 Encounters:   02/22/24 97.6 °F (36.4 °C)     BP Readings from Last 1 Encounters:   02/22/24 107/71     Pulse Readings from Last 1 Encounters:   02/22/24 70       Dermatological Exam    Skin:  Pedal hair growth, skin color, and skin texture normal on right     Nails:  Right 1st nail(s) ingrown along medial and lateral border    Vascular Exam    Arteries:  Posterior tibial artery palpable on right  Dorsalis pedis artery palpable on right     Veins:  Superficial veins unremarkable on right     Swelling:  None on right     Neurological Exam    [place] touch test:  Right foot protective sensation intact     Musculoskeletal Exam    Footwear:  Casual on right  Casual on left    Gait Exam:   Ambulatory Status: Ambulatory  Gait: Normal  Assistive Devices: None    Foot Progression Angle:  Normal on right  Normal on left    Right Lower Extremity Additional Findings:  Right foot and ankle function, strength, and range of motion unremarkable except as noted above.      Imaging and Other Tests    Imaging:  Independently  reviewed and interpreted imaging, findings are as follows: N/A     Assessment:     Encounter Diagnoses   Name Primary?    Ingrown nail Yes    Pain of right great toe         Plan:     I counseled the patient on her conditions, their implications and medical management.    Right 1st toenail medial and lateral borders ingrown, pain: acute on chronic  -Recommended right 1st toenail medial and lateral border permanent avulsions. Reviewed potential risks, benefits, alternatives. Patient amenable. Performed, see procedure note. Post-procedure instructions provided. Physical activity as tolerated.  -Offered pain medication, patient declined at this time.       Return to clinic PRN.

## 2024-02-23 NOTE — PROCEDURES
Nail Removal    Date/Time: 2/22/2024 3:00 PM    Performed by: Dharmesh Ricketts DPM  Authorized by: Dharmesh Ricketts DPM    Consent Done?:  Yes (Written)  Time out: Immediately prior to the procedure a time out was called    Location:     Location:  Right foot    Location detail:  Right big toe  Anesthesia:     Anesthesia:  Digital block    Local anesthetic:  Lidocaine 1% without epinephrine    Anesthetic total (ml):  5  Procedure Details:     Preparation:  Skin prepped with Betadine and skin prepped with alcohol    Amount removed:  Partial    Side:  Bilateral    Wedge excision of skin of nail fold: No      Nail bed sutured?: No      Nail matrix removed:  Partial    Removal method:  Phenol and alcohol    Dressing applied:  Antibiotic ointment, dressing applied and Xeroform gauze    Patient tolerance:  Patient tolerated the procedure well with no immediate complications

## 2024-02-29 DIAGNOSIS — L60.0 INGROWN NAIL: Primary | ICD-10-CM

## 2024-02-29 RX ORDER — CEPHALEXIN 500 MG/1
500 CAPSULE ORAL EVERY 8 HOURS
Qty: 21 CAPSULE | Refills: 0 | Status: SHIPPED | OUTPATIENT
Start: 2024-02-29

## 2024-02-29 RX ORDER — DOXYCYCLINE 100 MG/1
100 CAPSULE ORAL 2 TIMES DAILY
Qty: 14 CAPSULE | Refills: 0 | Status: SHIPPED | OUTPATIENT
Start: 2024-02-29 | End: 2024-02-29

## 2024-07-23 ENCOUNTER — OFFICE VISIT (OUTPATIENT)
Dept: INTERNAL MEDICINE | Facility: CLINIC | Age: 29
End: 2024-07-23
Payer: COMMERCIAL

## 2024-07-23 VITALS
WEIGHT: 130.94 LBS | TEMPERATURE: 98 F | HEART RATE: 81 BPM | OXYGEN SATURATION: 98 % | SYSTOLIC BLOOD PRESSURE: 116 MMHG | BODY MASS INDEX: 20.55 KG/M2 | HEIGHT: 67 IN | DIASTOLIC BLOOD PRESSURE: 68 MMHG

## 2024-07-23 DIAGNOSIS — J02.9 SORE THROAT: Primary | ICD-10-CM

## 2024-07-23 LAB
CTP QC/QA: YES
MOLECULAR STREP A: NEGATIVE

## 2024-07-23 PROCEDURE — 87651 STREP A DNA AMP PROBE: CPT | Mod: QW,S$GLB,, | Performed by: PHYSICIAN ASSISTANT

## 2024-07-23 PROCEDURE — 99999 PR PBB SHADOW E&M-EST. PATIENT-LVL III: CPT | Mod: PBBFAC,,, | Performed by: PHYSICIAN ASSISTANT

## 2024-07-23 PROCEDURE — 99213 OFFICE O/P EST LOW 20 MIN: CPT | Mod: S$GLB,,, | Performed by: PHYSICIAN ASSISTANT

## 2024-07-23 RX ORDER — AMOXICILLIN 875 MG/1
875 TABLET, FILM COATED ORAL EVERY 12 HOURS
Qty: 14 TABLET | Refills: 0 | Status: SHIPPED | OUTPATIENT
Start: 2024-07-23

## 2024-07-23 NOTE — PROGRESS NOTES
Subjective:       Patient ID: Opal Killian is a 29 y.o. female.        Chief Complaint: Sore Throat (Possible strep been taking Tylenol since yesterday.)    Opal Killian is an established patient of Taya Ayers MD here today for urgent care visit.    3 days ago  Fatigue, malaise  Temp 99.8  Sore throat  Tylenol/Advil around the clock  Tonsils enlarged, exudate  No URI sx    Breastfeeding           Review of Systems   Constitutional:  Negative for chills, diaphoresis, fatigue and fever.   HENT:  Positive for sore throat. Negative for congestion.    Eyes:  Negative for visual disturbance.   Respiratory:  Negative for cough, chest tightness and shortness of breath.    Cardiovascular:  Negative for chest pain, palpitations and leg swelling.   Gastrointestinal:  Negative for abdominal pain, blood in stool, constipation, diarrhea, nausea and vomiting.   Genitourinary:  Negative for dysuria, frequency, hematuria and urgency.   Musculoskeletal:  Positive for myalgias. Negative for arthralgias and back pain.   Skin:  Negative for rash.   Neurological:  Negative for dizziness, syncope, weakness and headaches.   Psychiatric/Behavioral:  Negative for dysphoric mood and sleep disturbance. The patient is not nervous/anxious.        Objective:      Physical Exam  Vitals and nursing note reviewed.   Constitutional:       Appearance: Normal appearance. She is well-developed.   HENT:      Head: Normocephalic.      Right Ear: Tympanic membrane and external ear normal.      Left Ear: Tympanic membrane and external ear normal.      Nose: No mucosal edema or rhinorrhea.      Mouth/Throat:      Pharynx: Oropharyngeal exudate and posterior oropharyngeal erythema present.   Eyes:      Pupils: Pupils are equal, round, and reactive to light.   Cardiovascular:      Rate and Rhythm: Normal rate and regular rhythm.      Heart sounds: Normal heart sounds. No murmur heard.     No friction rub. No gallop.   Pulmonary:      " Effort: Pulmonary effort is normal. No respiratory distress.      Breath sounds: Normal breath sounds.   Abdominal:      Palpations: Abdomen is soft.      Tenderness: There is no abdominal tenderness.   Skin:     General: Skin is warm and dry.   Neurological:      Mental Status: She is alert.         Assessment:       1. Sore throat        Plan:       Opal was seen today for sore throat.    Diagnoses and all orders for this visit:    Sore throat  -     POCT Strep A, Molecular  -     amoxicillin (AMOXIL) 875 MG tablet; Take 1 tablet (875 mg total) by mouth every 12 (twelve) hours.    Rapid strep negative but given all sx, will start amoxil empirically  Supportive care reviewed    Pt has been given instructions populated from patient instructions database and has verbalized understanding of the after visit summary and information contained wherein.    Follow up with a primary care provider. May go to ER for acute shortness of breath, lightheadedness, fever, or any other emergent complaints or changes in condition.    "This note will be shared with the patient"    No future appointments.              "

## 2024-07-24 ENCOUNTER — HOSPITAL ENCOUNTER (EMERGENCY)
Facility: HOSPITAL | Age: 29
Discharge: HOME | End: 2024-07-24
Attending: EMERGENCY MEDICINE
Payer: COMMERCIAL

## 2024-07-24 VITALS
DIASTOLIC BLOOD PRESSURE: 70 MMHG | OXYGEN SATURATION: 100 % | HEART RATE: 78 BPM | WEIGHT: 130.94 LBS | TEMPERATURE: 99 F | RESPIRATION RATE: 18 BRPM | HEIGHT: 67 IN | BODY MASS INDEX: 20.55 KG/M2 | SYSTOLIC BLOOD PRESSURE: 115 MMHG

## 2024-07-24 DIAGNOSIS — G43.809 OTHER MIGRAINE WITHOUT STATUS MIGRAINOSUS, NOT INTRACTABLE: ICD-10-CM

## 2024-07-24 DIAGNOSIS — J02.9 PHARYNGITIS, UNSPECIFIED ETIOLOGY: Primary | ICD-10-CM

## 2024-07-24 LAB
ALBUMIN SERPL BCP-MCNC: 3.8 G/DL (ref 3.5–5.2)
ALP SERPL-CCNC: 125 U/L (ref 55–135)
ALT SERPL W/O P-5'-P-CCNC: 10 U/L (ref 10–44)
ANION GAP SERPL CALC-SCNC: 9 MMOL/L (ref 8–16)
AST SERPL-CCNC: 20 U/L (ref 10–40)
B-HCG UR QL: NEGATIVE
BASOPHILS # BLD AUTO: 0.03 K/UL (ref 0–0.2)
BASOPHILS NFR BLD: 0.3 % (ref 0–1.9)
BILIRUB SERPL-MCNC: 0.4 MG/DL (ref 0.1–1)
BUN SERPL-MCNC: 12 MG/DL (ref 6–20)
CALCIUM SERPL-MCNC: 9.3 MG/DL (ref 8.7–10.5)
CHLORIDE SERPL-SCNC: 109 MMOL/L (ref 95–110)
CO2 SERPL-SCNC: 21 MMOL/L (ref 23–29)
CREAT SERPL-MCNC: 0.7 MG/DL (ref 0.5–1.4)
CTP QC/QA: YES
DIFFERENTIAL METHOD BLD: ABNORMAL
EOSINOPHIL # BLD AUTO: 0.1 K/UL (ref 0–0.5)
EOSINOPHIL NFR BLD: 1 % (ref 0–8)
ERYTHROCYTE [DISTWIDTH] IN BLOOD BY AUTOMATED COUNT: 12.1 % (ref 11.5–14.5)
EST. GFR  (NO RACE VARIABLE): >60 ML/MIN/1.73 M^2
GLUCOSE SERPL-MCNC: 102 MG/DL (ref 70–110)
HCT VFR BLD AUTO: 40.4 % (ref 37–48.5)
HGB BLD-MCNC: 12.9 G/DL (ref 12–16)
IMM GRANULOCYTES # BLD AUTO: 0.03 K/UL (ref 0–0.04)
IMM GRANULOCYTES NFR BLD AUTO: 0.3 % (ref 0–0.5)
LYMPHOCYTES # BLD AUTO: 1 K/UL (ref 1–4.8)
LYMPHOCYTES NFR BLD: 10.8 % (ref 18–48)
MCH RBC QN AUTO: 28.1 PG (ref 27–31)
MCHC RBC AUTO-ENTMCNC: 31.9 G/DL (ref 32–36)
MCV RBC AUTO: 88 FL (ref 82–98)
MONOCYTES # BLD AUTO: 0.6 K/UL (ref 0.3–1)
MONOCYTES NFR BLD: 6 % (ref 4–15)
NEUTROPHILS # BLD AUTO: 7.6 K/UL (ref 1.8–7.7)
NEUTROPHILS NFR BLD: 81.6 % (ref 38–73)
NRBC BLD-RTO: 0 /100 WBC
PLATELET # BLD AUTO: 173 K/UL (ref 150–450)
PMV BLD AUTO: 10.8 FL (ref 9.2–12.9)
POTASSIUM SERPL-SCNC: 4 MMOL/L (ref 3.5–5.1)
PROT SERPL-MCNC: 6.9 G/DL (ref 6–8.4)
RBC # BLD AUTO: 4.59 M/UL (ref 4–5.4)
SARS-COV-2 RDRP RESP QL NAA+PROBE: NEGATIVE
SODIUM SERPL-SCNC: 139 MMOL/L (ref 136–145)
WBC # BLD AUTO: 9.34 K/UL (ref 3.9–12.7)

## 2024-07-24 PROCEDURE — 96375 TX/PRO/DX INJ NEW DRUG ADDON: CPT

## 2024-07-24 PROCEDURE — 85025 COMPLETE CBC W/AUTO DIFF WBC: CPT | Performed by: EMERGENCY MEDICINE

## 2024-07-24 PROCEDURE — 99284 EMERGENCY DEPT VISIT MOD MDM: CPT | Mod: 25

## 2024-07-24 PROCEDURE — 25000003 PHARM REV CODE 250: Performed by: EMERGENCY MEDICINE

## 2024-07-24 PROCEDURE — 96361 HYDRATE IV INFUSION ADD-ON: CPT

## 2024-07-24 PROCEDURE — U0002 COVID-19 LAB TEST NON-CDC: HCPCS | Performed by: EMERGENCY MEDICINE

## 2024-07-24 PROCEDURE — 63600175 PHARM REV CODE 636 W HCPCS: Performed by: EMERGENCY MEDICINE

## 2024-07-24 PROCEDURE — 80053 COMPREHEN METABOLIC PANEL: CPT | Performed by: EMERGENCY MEDICINE

## 2024-07-24 PROCEDURE — 81025 URINE PREGNANCY TEST: CPT | Performed by: EMERGENCY MEDICINE

## 2024-07-24 PROCEDURE — 96365 THER/PROPH/DIAG IV INF INIT: CPT

## 2024-07-24 RX ORDER — METOCLOPRAMIDE HYDROCHLORIDE 5 MG/ML
10 INJECTION INTRAMUSCULAR; INTRAVENOUS
Status: COMPLETED | OUTPATIENT
Start: 2024-07-24 | End: 2024-07-24

## 2024-07-24 RX ORDER — KETOROLAC TROMETHAMINE 30 MG/ML
15 INJECTION, SOLUTION INTRAMUSCULAR; INTRAVENOUS
Status: COMPLETED | OUTPATIENT
Start: 2024-07-24 | End: 2024-07-24

## 2024-07-24 RX ORDER — PROCHLORPERAZINE EDISYLATE 5 MG/ML
5 INJECTION INTRAMUSCULAR; INTRAVENOUS
Status: DISCONTINUED | OUTPATIENT
Start: 2024-07-24 | End: 2024-07-24

## 2024-07-24 RX ORDER — DIPHENHYDRAMINE HYDROCHLORIDE 50 MG/ML
25 INJECTION INTRAMUSCULAR; INTRAVENOUS
Status: DISCONTINUED | OUTPATIENT
Start: 2024-07-24 | End: 2024-07-24

## 2024-07-24 RX ORDER — DEXAMETHASONE SODIUM PHOSPHATE 4 MG/ML
16 INJECTION, SOLUTION INTRA-ARTICULAR; INTRALESIONAL; INTRAMUSCULAR; INTRAVENOUS; SOFT TISSUE
Status: COMPLETED | OUTPATIENT
Start: 2024-07-24 | End: 2024-07-24

## 2024-07-24 RX ORDER — LIDOCAINE 50 MG/G
1 PATCH TOPICAL
Status: DISCONTINUED | OUTPATIENT
Start: 2024-07-24 | End: 2024-07-24 | Stop reason: HOSPADM

## 2024-07-24 RX ORDER — MAGNESIUM SULFATE HEPTAHYDRATE 40 MG/ML
2 INJECTION, SOLUTION INTRAVENOUS ONCE
Status: COMPLETED | OUTPATIENT
Start: 2024-07-24 | End: 2024-07-24

## 2024-07-24 RX ADMIN — LIDOCAINE 5% 1 PATCH: 700 PATCH TOPICAL at 03:07

## 2024-07-24 RX ADMIN — MAGNESIUM SULFATE HEPTAHYDRATE 2 G: 40 INJECTION, SOLUTION INTRAVENOUS at 04:07

## 2024-07-24 RX ADMIN — KETOROLAC TROMETHAMINE 15 MG: 30 INJECTION, SOLUTION INTRAMUSCULAR at 03:07

## 2024-07-24 RX ADMIN — METOCLOPRAMIDE 10 MG: 5 INJECTION, SOLUTION INTRAMUSCULAR; INTRAVENOUS at 03:07

## 2024-07-24 RX ADMIN — DEXAMETHASONE SODIUM PHOSPHATE 16 MG: 4 INJECTION INTRA-ARTICULAR; INTRALESIONAL; INTRAMUSCULAR; INTRAVENOUS; SOFT TISSUE at 04:07

## 2024-07-24 RX ADMIN — SODIUM CHLORIDE 1000 ML: 9 INJECTION, SOLUTION INTRAVENOUS at 03:07

## 2024-07-24 NOTE — ED PROVIDER NOTES
Encounter Date: 7/24/2024       History     Chief Complaint   Patient presents with    Headache     Headache/neck pain and low grade fever tonight     HPI     This is a pleasant 29-year-old female who presents the ER for evaluation of headache neck pain sore throat and fever.  Onset a proximally 1 day.  She endorses she has a history of migraine headaches but has not had 1 in awhile.  Normally her headaches improved with Tylenol and Motrin but has not improved.  She came to the ER for further evaluation.    Review of patient's allergies indicates:  No Known Allergies  Past Medical History:   Diagnosis Date    Anxiety disorder, unspecified     Chronic GERD      Past Surgical History:   Procedure Laterality Date    ESOPHAGOGASTRODUODENOSCOPY N/A 8/12/2022    Procedure: EGD (ESOPHAGOGASTRODUODENOSCOPY);  Surgeon: Naun Gaitan MD;  Location: [facility];  Service: General;  Laterality: N/A;    tempanoplasty Bilateral      No family history on file.  Social History     Tobacco Use    Smoking status: Never    Smokeless tobacco: Never   Substance Use Topics    Alcohol use: Not Currently     Comment: social    Drug use: Never     Review of Systems   Constitutional:  Positive for fatigue and fever.   Neurological:  Positive for headaches.   All other systems reviewed and are negative.      Physical Exam     Initial Vitals [07/24/24 0215]   BP Pulse Resp Temp SpO2   110/64 84 16 98.2 °F (36.8 °C) 97 %      MAP       --         Physical Exam    Nursing note and vitals reviewed.  Constitutional: She appears well-developed and well-nourished. No distress.   HENT:   Head: Normocephalic and atraumatic.   Eyes: Pupils are equal, round, and reactive to light.   Neck:    Paraspinal neck tenderness no meningeal sign   Normal range of motion.  Cardiovascular:  Normal rate, regular rhythm and normal heart sounds.           Pulmonary/Chest: Breath sounds normal. No respiratory distress.   Abdominal: Abdomen is soft. She exhibits no  distension. There is no abdominal tenderness.   Musculoskeletal:         General: Normal range of motion.      Cervical back: Normal range of motion.     Neurological: She is alert and oriented to person, place, and time. She has normal strength. GCS score is 15. GCS eye subscore is 4. GCS verbal subscore is 5. GCS motor subscore is 6.   Skin: Skin is warm and dry. Capillary refill takes less than 2 seconds.   Psychiatric: She has a normal mood and affect. Thought content normal.         ED Course   Procedures  Labs Reviewed   CBC W/ AUTO DIFFERENTIAL - Abnormal       Result Value    WBC 9.34      RBC 4.59      Hemoglobin 12.9      Hematocrit 40.4      MCV 88      MCH 28.1      MCHC 31.9 (*)     RDW 12.1      Platelets 173      MPV 10.8      Immature Granulocytes 0.3      Gran # (ANC) 7.6      Immature Grans (Abs) 0.03      Lymph # 1.0      Mono # 0.6      Eos # 0.1      Baso # 0.03      nRBC 0      Gran % 81.6 (*)     Lymph % 10.8 (*)     Mono % 6.0      Eosinophil % 1.0      Basophil % 0.3      Differential Method Automated     COMPREHENSIVE METABOLIC PANEL - Abnormal    Sodium 139      Potassium 4.0      Chloride 109      CO2 21 (*)     Glucose 102      BUN 12      Creatinine 0.7      Calcium 9.3      Total Protein 6.9      Albumin 3.8      Total Bilirubin 0.4      Alkaline Phosphatase 125      AST 20      ALT 10      eGFR >60.0      Anion Gap 9     SARS-COV-2 RNA AMPLIFICATION, QUAL    SARS-CoV-2 RNA, Amplification, Qual Negative     POCT URINE PREGNANCY    POC Preg Test, Ur Negative       Acceptable Yes            Imaging Results    None          Medications   LIDOcaine 5 % patch 1 patch (1 patch Transdermal Patch Applied 7/24/24 0318)   magnesium sulfate 2g in water 50mL IVPB (premix) (0 g Intravenous Stopped 7/24/24 0620)   sodium chloride 0.9% bolus 1,000 mL 1,000 mL (0 mLs Intravenous Stopped 7/24/24 0418)   ketorolac injection 15 mg (15 mg Intravenous Given 7/24/24 0318)   metoclopramide  injection 10 mg (10 mg Intravenous Given 7/24/24 0318)   dexAMETHasone injection 16 mg (16 mg Intravenous Given 7/24/24 0419)     Medical Decision Making   Pleasant 29-year-old female presents the ER for evaluation of headache neck pain sore throat.  History of migraine headaches similar but more intense.  No associated neurological symptoms.  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  Her physical exam is grossly reassuring no meningeal signs.  Differential includes viral syndrome, COVID, pharyngitis, electrolyte abnormality other cause.  Will plan blood work symptomatic support reassess.  Anticipate discharge.    Amount and/or Complexity of Data Reviewed  External Data Reviewed: labs and notes.  Labs: ordered. Decision-making details documented in ED Course.  ECG/medicine tests: ordered and independent interpretation performed. Decision-making details documented in ED Course.    Risk  Prescription drug management.               ED Course as of 07/24/24 0537   Wed Jul 24, 2024   0406   Resting in bed no distress.  Headache improving but still there.  Labs obtained and reviewed patient COVID negative no leukocytosis no left shift.  Will give Decadron for pharyngitis as well as magnesium for headache will reassess anticipate discharge soon [SE]   0406 CBC auto differential(!) [SE]   0406 Comprehensive metabolic panel(!) [SE]   0406 POCT urine pregnancy [SE]   0406 COVID-19 Rapid Screening [SE]   0500  Resting in bed no distress patient endorses improvement in symptoms wishes to be discharged return precautions discussed [SE]      ED Course User Index  [SE] Ani Daley MD                           Clinical Impression:  Final diagnoses:  [J02.9] Pharyngitis, unspecified etiology (Primary)  [G43.809] Other migraine without status migrainosus, not intractable          ED Disposition Condition    Discharge Stable          ED Prescriptions    None       Follow-up Information       Follow  up With Specialties Details Why Contact Info    Taya Ayers MD Internal Medicine Schedule an appointment as soon as possible for a visit  As needed [address]  [place] [address]  [phone]               Ani Daley MD  07/24/24 0573

## 2024-07-24 NOTE — DISCHARGE INSTRUCTIONS
Thank you for coming in to see us at [facility] It was nice to meet you, and I hope you feel better soon. Please feel free to return to the ER at any time should your symptoms get worse, or if you have different emergent concerns.    Our goal in the emergency department is to always give you outstanding care and exceptional service. You may receive a survey by mail or e-mail in the next week regarding your experience in our ED. We would greatly appreciate your completing and returning the survey. Your feedback provides us with a way to recognize our staff who give very good care and it helps us learn how to improve when your experience was below our aspiration of excellence.      It is important to remember that some problems or medical conditions are difficult to diagnose and may not be found or addressed during your Emergency Department visit.  These conditions often start with non-specific symptoms and can only be diagnosed on follow up visits with your primary care physician or specialist when the symptoms continue or change. Please remember that all medical conditions can change, and we cannot predict how you will be feeling tomorrow or the next day.    Return to the ER with any questions/concerns, new/concerning symptoms, worsening, failure to improve or inability to obtain follow-up.       Be sure to follow up with your primary care doctor and review all labs/imaging/tests that were performed during your ER visit with them. It is very common for us to identify non-emergent incidental findings which must be followed up with your primary care physician.  Some labs/imaging/tests may be outside of the normal range, and require non-emergent follow-up and/or further investigation/treatment/procedures/testing to help diagnose/exclude/prevent complications or other potentially serious medical conditions. Some abnormalities may not have been discussed or addressed during your ER visit. Some lab  results may not return during your ER visit but can be accessible by downloading the free [facility] atif or by visiting [email or web address] . It is important for you to review all labs/imaging/tests which are outside of the normal range with your physician.    An ER visit does not replace a primary care visit, and many screening tests or follow-up tests cannot be ordered by an ER doctor or performed by the ER. Some tests may even require pre-approval.    If you do not have a primary care doctor, you may contact the one listed on your discharge paperwork or you may also call the [facility] Appointment Desk at [phone] , or [facility] at  [phone] to schedule an appointment, or establish care with a primary care doctor or even a specialist and to obtain information about local resources. It is important to your health that you have a primary care doctor.    Please take all medications as directed. We have done our best to select a medication for you that will treat your condition however, all medications may potentially have side-effects and it is impossible to predict which medications may give you side-effects or what those side-effects (if any) those medications may give you.  If you feel that you are having a negative effect or side-effect of any medication you should stop taking those medications immediately and seek medical attention. If you feel that you are having a life-threatening reaction call 911.        Do not drive, swim, climb to height, take a bath, operate heavy machinery, drink alcohol or take potentially sedating medications, sign any legal documents or make any important decisions for 24 hours if you have received any pain medications, sedatives or mood altering drugs during your ER visit or within 24 hours of taking them if they have been prescribed to you.     You can find additional resources for Dentists, hearing aids, durable medical equipment, low cost pharmacies and  other resources at [email or web address]

## 2024-07-24 NOTE — ED TRIAGE NOTES
Opal Killian, a 29 y.o. female presents to the ED w/ complaint of headache, sore throat, fever, nausea.     Triage note:  Chief Complaint   Patient presents with    Headache     Headache/neck pain and low grade fever tonight     Review of patient's allergies indicates:  No Known Allergies  Past Medical History:   Diagnosis Date    Anxiety disorder, unspecified     Chronic GERD

## 2024-07-30 ENCOUNTER — HOSPITAL ENCOUNTER (EMERGENCY)
Facility: HOSPITAL | Age: 29
Discharge: HOME | End: 2024-07-30
Attending: EMERGENCY MEDICINE
Payer: COMMERCIAL

## 2024-07-30 VITALS
OXYGEN SATURATION: 99 % | DIASTOLIC BLOOD PRESSURE: 85 MMHG | RESPIRATION RATE: 16 BRPM | TEMPERATURE: 98 F | HEART RATE: 68 BPM | SYSTOLIC BLOOD PRESSURE: 125 MMHG

## 2024-07-30 DIAGNOSIS — J36 PERITONSILLAR ABSCESS: Primary | ICD-10-CM

## 2024-07-30 LAB
ALBUMIN SERPL BCP-MCNC: 3.9 G/DL (ref 3.5–5.2)
ALP SERPL-CCNC: 114 U/L (ref 55–135)
ALT SERPL W/O P-5'-P-CCNC: 9 U/L (ref 10–44)
ANION GAP SERPL CALC-SCNC: 10 MMOL/L (ref 8–16)
AST SERPL-CCNC: 13 U/L (ref 10–40)
BASOPHILS # BLD AUTO: 0.05 K/UL (ref 0–0.2)
BASOPHILS NFR BLD: 0.4 % (ref 0–1.9)
BILIRUB SERPL-MCNC: 0.5 MG/DL (ref 0.1–1)
BUN SERPL-MCNC: 12 MG/DL (ref 6–20)
CALCIUM SERPL-MCNC: 9.5 MG/DL (ref 8.7–10.5)
CHLORIDE SERPL-SCNC: 105 MMOL/L (ref 95–110)
CO2 SERPL-SCNC: 23 MMOL/L (ref 23–29)
CREAT SERPL-MCNC: 0.7 MG/DL (ref 0.5–1.4)
CRP SERPL-MCNC: 12 MG/L (ref 0–8.2)
DIFFERENTIAL METHOD BLD: ABNORMAL
EOSINOPHIL # BLD AUTO: 0.1 K/UL (ref 0–0.5)
EOSINOPHIL NFR BLD: 0.6 % (ref 0–8)
ERYTHROCYTE [DISTWIDTH] IN BLOOD BY AUTOMATED COUNT: 11.9 % (ref 11.5–14.5)
EST. GFR  (NO RACE VARIABLE): >60 ML/MIN/1.73 M^2
GLUCOSE SERPL-MCNC: 78 MG/DL (ref 70–110)
GROUP A STREP, MOLECULAR: NEGATIVE
HCT VFR BLD AUTO: 41.5 % (ref 37–48.5)
HETEROPH AB SERPL QL IA: NEGATIVE
HGB BLD-MCNC: 13.1 G/DL (ref 12–16)
IMM GRANULOCYTES # BLD AUTO: 0.05 K/UL (ref 0–0.04)
IMM GRANULOCYTES NFR BLD AUTO: 0.4 % (ref 0–0.5)
INFLUENZA A, MOLECULAR: NOT DETECTED
INFLUENZA B, MOLECULAR: NOT DETECTED
LYMPHOCYTES # BLD AUTO: 2.4 K/UL (ref 1–4.8)
LYMPHOCYTES NFR BLD: 20.9 % (ref 18–48)
MCH RBC QN AUTO: 27.5 PG (ref 27–31)
MCHC RBC AUTO-ENTMCNC: 31.6 G/DL (ref 32–36)
MCV RBC AUTO: 87 FL (ref 82–98)
MONOCYTES # BLD AUTO: 1 K/UL (ref 0.3–1)
MONOCYTES NFR BLD: 9 % (ref 4–15)
NEUTROPHILS # BLD AUTO: 7.9 K/UL (ref 1.8–7.7)
NEUTROPHILS NFR BLD: 68.7 % (ref 38–73)
NRBC BLD-RTO: 0 /100 WBC
PLATELET # BLD AUTO: 297 K/UL (ref 150–450)
PMV BLD AUTO: 9.7 FL (ref 9.2–12.9)
POTASSIUM SERPL-SCNC: 3.7 MMOL/L (ref 3.5–5.1)
PROT SERPL-MCNC: 7.5 G/DL (ref 6–8.4)
RBC # BLD AUTO: 4.76 M/UL (ref 4–5.4)
RSV AG BY MOLECULAR METHOD: NOT DETECTED
SARS-COV-2 RNA RESP QL NAA+PROBE: NOT DETECTED
SODIUM SERPL-SCNC: 138 MMOL/L (ref 136–145)
WBC # BLD AUTO: 11.51 K/UL (ref 3.9–12.7)

## 2024-07-30 PROCEDURE — 87070 CULTURE OTHR SPECIMN AEROBIC: CPT | Performed by: STUDENT IN AN ORGANIZED HEALTH CARE EDUCATION/TRAINING PROGRAM

## 2024-07-30 PROCEDURE — 86140 C-REACTIVE PROTEIN: CPT | Performed by: EMERGENCY MEDICINE

## 2024-07-30 PROCEDURE — 86308 HETEROPHILE ANTIBODY SCREEN: CPT | Performed by: EMERGENCY MEDICINE

## 2024-07-30 PROCEDURE — 25500020 PHARM REV CODE 255: Performed by: EMERGENCY MEDICINE

## 2024-07-30 PROCEDURE — 42700 I&D ABSCESS PERITONSILLAR: CPT

## 2024-07-30 PROCEDURE — 87076 CULTURE ANAEROBE IDENT EACH: CPT | Performed by: STUDENT IN AN ORGANIZED HEALTH CARE EDUCATION/TRAINING PROGRAM

## 2024-07-30 PROCEDURE — 87651 STREP A DNA AMP PROBE: CPT | Performed by: EMERGENCY MEDICINE

## 2024-07-30 PROCEDURE — 25000003 PHARM REV CODE 250: Performed by: EMERGENCY MEDICINE

## 2024-07-30 PROCEDURE — 80053 COMPREHEN METABOLIC PANEL: CPT | Performed by: EMERGENCY MEDICINE

## 2024-07-30 PROCEDURE — 63600175 PHARM REV CODE 636 W HCPCS: Performed by: EMERGENCY MEDICINE

## 2024-07-30 PROCEDURE — 87075 CULTR BACTERIA EXCEPT BLOOD: CPT | Performed by: STUDENT IN AN ORGANIZED HEALTH CARE EDUCATION/TRAINING PROGRAM

## 2024-07-30 PROCEDURE — 99285 EMERGENCY DEPT VISIT HI MDM: CPT | Mod: 25

## 2024-07-30 PROCEDURE — 96375 TX/PRO/DX INJ NEW DRUG ADDON: CPT

## 2024-07-30 PROCEDURE — 0241U SARS-COV2 (COVID) WITH FLU/RSV BY PCR: CPT | Performed by: EMERGENCY MEDICINE

## 2024-07-30 PROCEDURE — 85025 COMPLETE CBC W/AUTO DIFF WBC: CPT | Performed by: EMERGENCY MEDICINE

## 2024-07-30 PROCEDURE — 96365 THER/PROPH/DIAG IV INF INIT: CPT

## 2024-07-30 RX ORDER — AMOXICILLIN AND CLAVULANATE POTASSIUM 875; 125 MG/1; MG/1
1 TABLET, FILM COATED ORAL 2 TIMES DAILY
Qty: 28 TABLET | Refills: 0 | Status: SHIPPED | OUTPATIENT
Start: 2024-07-30 | End: 2024-08-20

## 2024-07-30 RX ORDER — DEXAMETHASONE SODIUM PHOSPHATE 4 MG/ML
10 INJECTION, SOLUTION INTRA-ARTICULAR; INTRALESIONAL; INTRAMUSCULAR; INTRAVENOUS; SOFT TISSUE
Status: COMPLETED | OUTPATIENT
Start: 2024-07-30 | End: 2024-07-30

## 2024-07-30 RX ORDER — AMOXICILLIN AND CLAVULANATE POTASSIUM 875; 125 MG/1; MG/1
1 TABLET, FILM COATED ORAL 2 TIMES DAILY
Qty: 14 TABLET | Refills: 0 | Status: SHIPPED | OUTPATIENT
Start: 2024-07-30 | End: 2024-07-30

## 2024-07-30 RX ADMIN — AMPICILLIN SODIUM AND SULBACTAM SODIUM 3 G: 2; 1 INJECTION, POWDER, FOR SOLUTION INTRAMUSCULAR; INTRAVENOUS at 04:07

## 2024-07-30 RX ADMIN — DEXAMETHASONE SODIUM PHOSPHATE 10 MG: 4 INJECTION INTRA-ARTICULAR; INTRALESIONAL; INTRAMUSCULAR; INTRAVENOUS; SOFT TISSUE at 04:07

## 2024-07-30 RX ADMIN — IOHEXOL 75 ML: 350 INJECTION, SOLUTION INTRAVENOUS at 04:07

## 2024-08-01 LAB — BACTERIA SPEC AEROBE CULT: NORMAL

## 2024-08-05 LAB — BACTERIA SPEC ANAEROBE CULT: ABNORMAL

## 2024-08-07 ENCOUNTER — OFFICE VISIT (OUTPATIENT)
Dept: OTOLARYNGOLOGY | Facility: CLINIC | Age: 29
End: 2024-08-07
Payer: COMMERCIAL

## 2024-08-07 VITALS
WEIGHT: 126.56 LBS | DIASTOLIC BLOOD PRESSURE: 64 MMHG | HEIGHT: 67 IN | BODY MASS INDEX: 19.87 KG/M2 | SYSTOLIC BLOOD PRESSURE: 98 MMHG | HEART RATE: 74 BPM

## 2024-08-07 DIAGNOSIS — J36 PERITONSILLAR ABSCESS: Primary | ICD-10-CM

## 2024-08-07 PROCEDURE — 99213 OFFICE O/P EST LOW 20 MIN: CPT | Mod: S$GLB,,, | Performed by: PHYSICIAN ASSISTANT

## 2024-08-07 PROCEDURE — 99999 PR PBB SHADOW E&M-EST. PATIENT-LVL III: CPT | Mod: PBBFAC,,, | Performed by: PHYSICIAN ASSISTANT

## 2024-10-21 PROBLEM — Z37.9 VACUUM-ASSISTED VAGINAL DELIVERY: Status: RESOLVED | Noted: 2023-11-14 | Resolved: 2024-10-21

## 2024-11-21 ENCOUNTER — OFFICE VISIT (OUTPATIENT)
Dept: OPTOMETRY | Facility: CLINIC | Age: 29
End: 2024-11-21
Payer: COMMERCIAL

## 2024-11-21 DIAGNOSIS — Z46.0 FITTING AND ADJUSTMENT OF SPECTACLES AND CONTACT LENSES: ICD-10-CM

## 2024-11-21 DIAGNOSIS — Z46.0 FITTING AND ADJUSTMENT OF SPECTACLES AND CONTACT LENSES: Primary | ICD-10-CM

## 2024-11-21 DIAGNOSIS — Z97.3 WEARS CONTACT LENSES: ICD-10-CM

## 2024-11-21 DIAGNOSIS — H11.431 CONJUNCTIVAL HYPEREMIA OF RIGHT EYE: Primary | ICD-10-CM

## 2024-11-21 DIAGNOSIS — H52.203 ASTIGMATISM OF BOTH EYES, UNSPECIFIED TYPE: ICD-10-CM

## 2024-11-21 PROCEDURE — 99999 PR PBB SHADOW E&M-EST. PATIENT-LVL II: CPT | Mod: PBBFAC,,, | Performed by: OPTOMETRIST

## 2024-11-21 PROCEDURE — 92015 DETERMINE REFRACTIVE STATE: CPT | Mod: S$GLB,,, | Performed by: OPTOMETRIST

## 2024-11-21 PROCEDURE — 92004 COMPRE OPH EXAM NEW PT 1/>: CPT | Mod: S$GLB,,, | Performed by: OPTOMETRIST

## 2024-11-21 NOTE — PROGRESS NOTES
HPI    YOSI: 1yrs    CC: Pt is here today for a routine eye exam. Pt states that her vision has   been stable since her last exam.     (-)Dryness  (-)Burning  (-)Itchiness  (-)Tearing  (-)Flashes  (-)Floaters   (-)Photophobia  (-)Eye Pain      Diabetic: no    Contact Lens: yes                           Sleep in CL?: no                            Daily wear time: 12-15hrs                           Days worn before discarded: 1 month                           Solution: biotrue    Eye Meds: none    Ocular History: none    PD: 56.5    Last edited by Giana Potts MA on 11/21/2024  8:24 AM.            Assessment /Plan     For exam results, see Encounter Report.    Conjunctival hyperemia of right eye   Use Ivizia PRN    Astigmatism of both eyes, unspecified type   Rx specs    Wears contact lenses  Fitting and adjustment of spectacles and contact lenses   Good fit and vision with current specs   Ok to order   Remove nightly, replace monthly     Good internal ocular health      RTC 1 year

## 2024-12-03 ENCOUNTER — OFFICE VISIT (OUTPATIENT)
Dept: OBSTETRICS AND GYNECOLOGY | Facility: CLINIC | Age: 29
End: 2024-12-03
Payer: COMMERCIAL

## 2024-12-03 VITALS — HEIGHT: 67 IN | SYSTOLIC BLOOD PRESSURE: 100 MMHG | BODY MASS INDEX: 19.81 KG/M2 | DIASTOLIC BLOOD PRESSURE: 60 MMHG

## 2024-12-03 DIAGNOSIS — Z97.5 IUD (INTRAUTERINE DEVICE) IN PLACE: ICD-10-CM

## 2024-12-03 DIAGNOSIS — Z01.419 WELL WOMAN EXAM WITH ROUTINE GYNECOLOGICAL EXAM: Primary | ICD-10-CM

## 2024-12-03 PROCEDURE — 99999 PR PBB SHADOW E&M-EST. PATIENT-LVL III: CPT | Mod: PBBFAC,,, | Performed by: STUDENT IN AN ORGANIZED HEALTH CARE EDUCATION/TRAINING PROGRAM

## 2024-12-03 NOTE — PROGRESS NOTES
[facility]  Obstetrics & Gynecology      History of Present Illness:   Menstrual History: Menarche started at 14 y/o. H/o longer cycles, 50-55 days. Would bleed for 6-7 giron. Denies hx of dysmenorrhea or menorrhagia.   First IUD at 18, taken out for baby, new IUD after baby, ready for removal today.   Obstetric Hx: 1 - 1 year and 2 weeks- [place]  LMP: over a year  STD/STI Hx: Denies any history of STD's  Contraception Hx: IUD  Sexually Active: one male partner  Family history: Denies personal history of GYN/colon cancers.  maternal GF, colon cancer in 70s. Maternal aunt breast cancer, upper 50s.   Social: Wears seatbelts. Exercises. Feels safe at home. No severe depressive episodes recently. No issues with tobacco/vaping, EtOH, illicit drugs.  Last pap: 2022- normal  HPV vaccine: yes  Vitamins: prenatal      Current Outpatient Medications on File Prior to Visit   Medication Sig    FLUoxetine 40 MG capsule Take 1 capsule (40 mg total) by mouth once daily.    lansoprazole (PREVACID) 30 MG capsule Take 1 capsule (30 mg total) by mouth once daily.    prenatal vit no.124/iron/folic (PRENATAL VITAMIN ORAL)      Current Facility-Administered Medications on File Prior to Visit   Medication    levonorgestreL (MIRENA) 21 mcg/24 hours (8 yrs) 52 mg IUD 1 Intra Uterine Device       Review of patient's allergies indicates:  No Known Allergies    Past Medical History:   Diagnosis Date    Anxiety disorder, unspecified     Chronic GERD      OB History    Para Term  AB Living   1 1 1 0 0 1   SAB IAB Ectopic Multiple Live Births   0 0 0 0 1      # Outcome Date GA Lbr Kapil/2nd Weight Sex Type Anes PTL Lv   1 Term 23 39w2d  2.59 kg (5 lb 11.4 oz) F Vag-Vacuum EPI N KAJAL      Complications: Fetal Intolerance, Dysfunctional Labor      Name: RENÉ STALEY      Apgar1: 6  Apgar5: 8     Past Surgical History:   Procedure Laterality Date    ESOPHAGOGASTRODUODENOSCOPY N/A 2022    Procedure: EGD  (ESOPHAGOGASTRODUODENOSCOPY);  Surgeon: Naun Gaitan MD;  Location: [facility];  Service: General;  Laterality: N/A;    tempanoplasty Bilateral      Family History    None       Tobacco Use    Smoking status: Never    Smokeless tobacco: Never   Substance and Sexual Activity    Alcohol use: Not Currently     Comment: social    Drug use: Never    Sexual activity: Yes     Partners: Male     Birth control/protection: None     Review of Systems   Constitutional:  Negative for activity change, appetite change, fever and unexpected weight change.   Respiratory:  Negative for shortness of breath.    Cardiovascular:  Negative for chest pain.   Gastrointestinal:  Negative for abdominal pain, blood in stool, constipation, diarrhea, nausea and vomiting.   Genitourinary:  Negative for dysmenorrhea, dyspareunia, dysuria, hematuria, menorrhagia, pelvic pain, vaginal bleeding, vaginal discharge, vaginal pain, urinary incontinence, postcoital bleeding and vaginal odor.   Integumentary:  Negative for breast mass and nipple discharge.   Breast: Negative for lump, mass and nipple discharge    Objective:     Vital Signs (Most Recent):    Vital Signs (24h Range):  [unfilled]        There is no height or weight on file to calculate BMI.  No LMP recorded.    Physical Exam:   Constitutional: She is oriented to person, place, and time. She appears well-developed and well-nourished. No distress.    HENT:   Head: Normocephalic and atraumatic.    Eyes: Conjunctivae and EOM are normal.    Neck: No thyromegaly present.    Cardiovascular:  Normal rate.             Pulmonary/Chest: Effort normal. No respiratory distress. Right breast exhibits no inverted nipple, no mass, no nipple discharge, no skin change, no tenderness, presence, no bleeding and no swelling. Left breast exhibits no inverted nipple, no mass, no nipple discharge, no skin change, no tenderness, presence, no bleeding and no swelling.        Abdominal: Soft. She exhibits no  distension. There is no abdominal tenderness.     Genitourinary:    Urethra, vagina, uterus, right adnexa and left adnexa normal.   The external female genitalia was normal.   Cervix is normal.           Musculoskeletal: Normal range of motion and moves all extremeties. No tenderness or edema.       Neurological: She is alert and oriented to person, place, and time.    Skin: Skin is warm and dry. No rash noted.    Psychiatric: She has a normal mood and affect. Her behavior is normal.       Assessment/Plan:     WWE  - Vaccines utd  - Pap due 2025  - Mammogram age 39  - GC/CT, affirm n/a  - Daily vitamin discussed.  - IUD removed  - CBE normal. Physical exam normal. VSS.  - RTC for annual or PRN.      Lily Pineda MD  Obstetrics & Gynecology  [facility]

## 2024-12-03 NOTE — PROCEDURES
Removal of IUD    Date/Time: 12/3/2024 2:45 PM    Performed by: Lily Pineda MD  Authorized by: Lily Pineda MD    Consent obtained:  Prior to procedure the appropriate consent was completed and verified  Consent given by:  Patient  Procedure risks and benefits discussed: yes    Patient questions answered: yes    Patient agrees, verbalizes understanding, and wants to proceed: yes    Implant grasped by: ring forceps  Removal due to infection and inflammatory reaction: no    Other reason for removal:  Desires conception  Removal due to mechanical complications: no    Removed with no complications: yes  no

## 2024-12-13 ENCOUNTER — OFFICE VISIT (OUTPATIENT)
Dept: INTERNAL MEDICINE | Facility: CLINIC | Age: 29
End: 2024-12-13
Payer: COMMERCIAL

## 2024-12-13 VITALS
HEART RATE: 95 BPM | DIASTOLIC BLOOD PRESSURE: 64 MMHG | RESPIRATION RATE: 15 BRPM | WEIGHT: 129.63 LBS | OXYGEN SATURATION: 100 % | BODY MASS INDEX: 20.35 KG/M2 | SYSTOLIC BLOOD PRESSURE: 90 MMHG | HEIGHT: 67 IN | TEMPERATURE: 99 F

## 2024-12-13 DIAGNOSIS — R68.89 FLU-LIKE SYMPTOMS: Primary | ICD-10-CM

## 2024-12-13 LAB
INFLUENZA A, MOLECULAR: POSITIVE
INFLUENZA B, MOLECULAR: NEGATIVE
SARS-COV-2 RNA RESP QL NAA+PROBE: NOT DETECTED
SPECIMEN SOURCE: ABNORMAL

## 2024-12-13 PROCEDURE — 99999 PR PBB SHADOW E&M-EST. PATIENT-LVL III: CPT | Mod: PBBFAC,,, | Performed by: PHYSICIAN ASSISTANT

## 2024-12-13 PROCEDURE — 87635 SARS-COV-2 COVID-19 AMP PRB: CPT | Performed by: PHYSICIAN ASSISTANT

## 2024-12-13 PROCEDURE — 87502 INFLUENZA DNA AMP PROBE: CPT | Performed by: PHYSICIAN ASSISTANT

## 2024-12-13 RX ORDER — OSELTAMIVIR PHOSPHATE 75 MG/1
75 CAPSULE ORAL 2 TIMES DAILY
Qty: 10 CAPSULE | Refills: 0 | Status: SHIPPED | OUTPATIENT
Start: 2024-12-13 | End: 2024-12-18

## 2024-12-13 NOTE — PROGRESS NOTES
[facility] Note    Chief Complaint   Flu like symptoms    History of Present Illness      Ms. Opal Killian is a 29 y.o. female who presents today for flu like symptoms    Onset: Yesterday  Exposure: 2 yo child is sick (goes to ), also works as a nurse in General Surgery   Any other family members with symptoms: Child  Symptoms: fever, body aches, cough (mildly productive), headache, running nose, sinus congestion  Denies GI upset, chest pain, SOB  Flu vaccine? Yes  COVID booster? No  What have you tried? Tylenol     Breastfeeding? No     Past Medical History:  Past Medical History:   Diagnosis Date    Anxiety disorder, unspecified     Chronic GERD        Past Surgical History:  Past Surgical History:   Procedure Laterality Date    ESOPHAGOGASTRODUODENOSCOPY N/A 8/12/2022    Procedure: EGD (ESOPHAGOGASTRODUODENOSCOPY);  Surgeon: Naun Gaitan MD;  Location: [facility];  Service: General;  Laterality: N/A;    tempanoplasty Bilateral        Family History:  family history includes Breast cancer in her maternal aunt; Colon cancer in her maternal grandfather.     Social History:  Social History     Socioeconomic History    Marital status:    Tobacco Use    Smoking status: Never    Smokeless tobacco: Never   Substance and Sexual Activity    Alcohol use: Not Currently     Comment: social    Drug use: Never    Sexual activity: Yes     Partners: Male     Birth control/protection: None     Social Drivers of Health     Financial Resource Strain: Low Risk  (11/16/2023)    Overall Financial Resource Strain (CARDIA)     Difficulty of Paying Living Expenses: Not hard at all   Food Insecurity: No Food Insecurity (11/16/2023)    Hunger Vital Sign     Worried About Running Out of Food in the Last Year: Never true     Ran Out of Food in the Last Year: Never true   Transportation Needs: No Transportation Needs (11/16/2023)    PRAPARE - Transportation     Lack of Transportation (Medical):  No     Lack of Transportation (Non-Medical): No   Physical Activity: Insufficiently Active (11/16/2023)    Exercise Vital Sign     Days of Exercise per Week: 3 days     Minutes of Exercise per Session: 40 min   Stress: No Stress Concern Present (11/16/2023)    [employer] [place] [employer] - Occupational Stress Questionnaire     Feeling of Stress : Only a little   Housing Stability: Low Risk  (11/16/2023)    Housing Stability Vital Sign     Unable to Pay for Housing in the Last Year: No     Number of Places Lived in the Last Year: 2     Unstable Housing in the Last Year: No        Medications:  Outpatient Encounter Medications as of 12/13/2024   Medication Sig Dispense Refill    prenatal vit no.124/iron/folic (PRENATAL VITAMIN ORAL)        Facility-Administered Encounter Medications as of 12/13/2024   Medication Dose Route Frequency Provider Last Rate Last Admin    levonorgestreL (MIRENA) 21 mcg/24 hours (8 yrs) 52 mg IUD 1 Intra Uterine Device  1 Intra Uterine Device Intrauterine  Edison, Lily AGUIRRE MD   1 Intra Uterine Device at 01/22/24 1615       Allergies:  Review of patient's allergies indicates:  No Known Allergies    Health Maintenance:  Health Maintenance   Topic Date Due    Lipid Panel  Never done    Pneumococcal Vaccines (Age 0-64) (1 of 2 - PCV) Never done    COVID-19 Vaccine (3 - 2024-25 season) 09/01/2024    Cervical Cancer Screening  08/23/2025    TETANUS VACCINE  09/12/2033    RSV Vaccine (Age 60+ and Pregnant patients) (1 - 1-dose 75+ series) 02/12/2069    Hepatitis C Screening  Completed    Influenza Vaccine  Completed    HIV Screening  Completed     Health Maintenance Topics with due status: Not Due       Topic Last Completion Date    Cervical Cancer Screening 08/23/2022    TETANUS VACCINE 09/12/2023    RSV Vaccine (Age 60+ and Pregnant patients) Not Due       Review of Systems:  Review of Systems   Constitutional:  Positive for chills and fever. Negative for weight loss.   HENT:   "Positive for congestion and sinus pain. Negative for ear discharge, ear pain, nosebleeds and sore throat.    Eyes:  Negative for blurred vision, double vision and photophobia.   Respiratory:  Positive for cough. Negative for sputum production, shortness of breath and wheezing.    Cardiovascular:  Negative for chest pain and palpitations.   Gastrointestinal:  Negative for abdominal pain, constipation, diarrhea, heartburn, nausea and vomiting.   Genitourinary:  Negative for dysuria and urgency.   Musculoskeletal:  Positive for joint pain and myalgias.   Skin:  Negative for rash.   Neurological:  Negative for tingling, focal weakness, weakness and headaches.       Physical Exam      Vital Signs  Temp: 99.2 °F (37.3 °C)  Temp Source: Temporal  Pulse: 95  Resp: 15  SpO2: 100 %  BP: 90/64  BP Location: Left arm  Patient Position: Sitting  Pain Score: 0-No pain  Height and Weight  Height: 5' 7" (170.2 cm)  Weight: 58.8 kg (129 lb 10.1 oz)  BSA (Calculated - sq m): 1.67 sq meters  BMI (Calculated): 20.3  Weight in (lb) to have BMI = 25: 159.3]    Physical Exam  Constitutional:       Appearance: Normal appearance.   HENT:      Right Ear: Tympanic membrane normal.      Left Ear: Tympanic membrane normal.      Mouth/Throat:      Mouth: Mucous membranes are moist.      Pharynx: Posterior oropharyngeal erythema present. No oropharyngeal exudate.   Eyes:      Extraocular Movements: Extraocular movements intact.      Pupils: Pupils are equal, round, and reactive to light.   Cardiovascular:      Rate and Rhythm: Normal rate and regular rhythm.      Pulses: Normal pulses.   Pulmonary:      Effort: Pulmonary effort is normal.      Breath sounds: Normal breath sounds.   Abdominal:      General: Abdomen is flat.      Palpations: Abdomen is soft.   Skin:     General: Skin is warm and dry.      Capillary Refill: Capillary refill takes less than 2 seconds.      Findings: No rash.   Neurological:      Mental Status: She is alert.      "     Laboratory:  CBC:  Recent Labs   Lab 11/15/23  0441 07/24/24  0309 07/30/24  0409   WBC 11.42 9.34 11.51   RBC 3.63 L 4.59 4.76   Hemoglobin 10.6 L 12.9 13.1   Hematocrit 33.0 L 40.4 41.5   Platelets 161 173 297   MCV 91 88 87   MCH 29.2 28.1 27.5   MCHC 32.1 31.9 L 31.6 L     CMP:  Recent Labs   Lab 03/28/23  1458 07/24/24  0309 07/30/24  0409   Glucose 77 102 78   Calcium 9.3 9.3 9.5   Albumin 4.0 3.8 3.9   Total Protein 6.8 6.9 7.5   Sodium 138 139 138   Potassium 3.5 4.0 3.7   CO2 23 21 L 23   Chloride 108 109 105   BUN 5 L 12 12   Alkaline Phosphatase 67 125 114   ALT 10 10 9 L   AST 22 20 13   Total Bilirubin 0.4 0.4 0.5     URINALYSIS:       LIPIDS:  Recent Labs   Lab 02/15/23  1214   TSH 2.701     TSH:  Recent Labs   Lab 02/15/23  1214   TSH 2.701       Assessment/Plan     Opal Killian is a 29 y.o.female with:      1. Flu-like symptoms  -     Influenza A & B by Molecular  -     COVID-19 Routine Screening     - If flu testing comes back positive, patient would like to be started on Tamiflu.    - I encouraged supportive care - Drink plenty of fluids, rest, tylenol for pain and fever, OTC decongestants, saline nasal spray to irrigate sinuses; can do warm salt water gargles, lozenges, or warm tea with honey to help with sore throat.      Continue current medications and maintain follow up with specialists.  Keep appointment with new PCP on 12/26/24 for annual wellness exam.    Cynthia Trejo PA-C  [facility]

## 2024-12-18 ENCOUNTER — PATIENT MESSAGE (OUTPATIENT)
Dept: OPTOMETRY | Facility: CLINIC | Age: 29
End: 2024-12-18
Payer: COMMERCIAL

## 2024-12-27 ENCOUNTER — PATIENT MESSAGE (OUTPATIENT)
Dept: OPTOMETRY | Facility: CLINIC | Age: 29
End: 2024-12-27
Payer: COMMERCIAL

## 2025-01-09 ENCOUNTER — OFFICE VISIT (OUTPATIENT)
Dept: PRIMARY CARE CLINIC | Facility: CLINIC | Age: 30
End: 2025-01-09
Payer: COMMERCIAL

## 2025-01-09 VITALS
TEMPERATURE: 98 F | HEART RATE: 65 BPM | DIASTOLIC BLOOD PRESSURE: 68 MMHG | OXYGEN SATURATION: 97 % | HEIGHT: 67 IN | WEIGHT: 133.38 LBS | BODY MASS INDEX: 20.93 KG/M2 | SYSTOLIC BLOOD PRESSURE: 102 MMHG

## 2025-01-09 DIAGNOSIS — F33.42 RECURRENT MAJOR DEPRESSIVE DISORDER, IN FULL REMISSION: ICD-10-CM

## 2025-01-09 DIAGNOSIS — K21.9 GASTROESOPHAGEAL REFLUX DISEASE, UNSPECIFIED WHETHER ESOPHAGITIS PRESENT: ICD-10-CM

## 2025-01-09 DIAGNOSIS — Z00.00 ANNUAL PHYSICAL EXAM: Primary | ICD-10-CM

## 2025-01-09 PROCEDURE — 3008F BODY MASS INDEX DOCD: CPT | Mod: CPTII,S$GLB,, | Performed by: STUDENT IN AN ORGANIZED HEALTH CARE EDUCATION/TRAINING PROGRAM

## 2025-01-09 PROCEDURE — 1159F MED LIST DOCD IN RCRD: CPT | Mod: CPTII,S$GLB,, | Performed by: STUDENT IN AN ORGANIZED HEALTH CARE EDUCATION/TRAINING PROGRAM

## 2025-01-09 PROCEDURE — 99395 PREV VISIT EST AGE 18-39: CPT | Mod: S$GLB,,, | Performed by: STUDENT IN AN ORGANIZED HEALTH CARE EDUCATION/TRAINING PROGRAM

## 2025-01-09 PROCEDURE — 3074F SYST BP LT 130 MM HG: CPT | Mod: CPTII,S$GLB,, | Performed by: STUDENT IN AN ORGANIZED HEALTH CARE EDUCATION/TRAINING PROGRAM

## 2025-01-09 PROCEDURE — 99999 PR PBB SHADOW E&M-EST. PATIENT-LVL III: CPT | Mod: PBBFAC,,, | Performed by: STUDENT IN AN ORGANIZED HEALTH CARE EDUCATION/TRAINING PROGRAM

## 2025-01-09 PROCEDURE — 3078F DIAST BP <80 MM HG: CPT | Mod: CPTII,S$GLB,, | Performed by: STUDENT IN AN ORGANIZED HEALTH CARE EDUCATION/TRAINING PROGRAM

## 2025-01-09 PROCEDURE — 1160F RVW MEDS BY RX/DR IN RCRD: CPT | Mod: CPTII,S$GLB,, | Performed by: STUDENT IN AN ORGANIZED HEALTH CARE EDUCATION/TRAINING PROGRAM

## 2025-01-09 RX ORDER — LANSOPRAZOLE 30 MG/1
30 CAPSULE, DELAYED RELEASE ORAL DAILY
COMMUNITY
End: 2025-01-09 | Stop reason: SDUPTHER

## 2025-01-09 RX ORDER — FLUOXETINE HYDROCHLORIDE 40 MG/1
40 CAPSULE ORAL NIGHTLY
Qty: 90 CAPSULE | Refills: 3 | Status: SHIPPED | OUTPATIENT
Start: 2025-01-09

## 2025-01-09 RX ORDER — FLUOXETINE HYDROCHLORIDE 40 MG/1
40 CAPSULE ORAL NIGHTLY
COMMUNITY
End: 2025-01-09 | Stop reason: SDUPTHER

## 2025-01-09 RX ORDER — LANSOPRAZOLE 30 MG/1
30 CAPSULE, DELAYED RELEASE ORAL DAILY
Qty: 90 CAPSULE | Refills: 3 | Status: SHIPPED | OUTPATIENT
Start: 2025-01-09

## 2025-01-09 NOTE — PROGRESS NOTES
Office visit  Patient: Opal Killian   1/9/2025       Assessment/Plan:       1. Annual physical exam  -     TSH; Future; Expected date: 01/09/2025  -     Cancel: Hemoglobin A1C; Future; Expected date: 01/09/2025  -     Lipid Panel; Future; Expected date: 01/09/2025  -     Comprehensive Metabolic Panel; Future; Expected date: 01/09/2025  -     CBC Auto Differential; Future; Expected date: 01/09/2025        -Discussed healthy habits and recommended preventative screening    2. Recurrent major depressive disorder, in full remission  -     FLUoxetine 40 MG capsule; Take 1 capsule (40 mg total) by mouth every evening.  Dispense: 90 capsule; Refill: 3        -stable, continue to monitor    3. Gastroesophageal reflux disease, unspecified whether esophagitis present  -     lansoprazole (PREVACID) 30 MG capsule; Take 1 capsule (30 mg total) by mouth once daily.  Dispense: 90 capsule; Refill: 3        CHIEF COMPLAINT: annual physical    HPI: Opal Killian is a 29 y.o. female who presents for an annual physical. She has no complaints.    Review of Systems   Constitutional:  Negative for fever and malaise/fatigue.   HENT:  Negative for congestion, ear discharge, ear pain and sore throat.    Eyes:  Negative for pain and discharge.   Respiratory:  Negative for cough and shortness of breath.    Cardiovascular:  Negative for chest pain and palpitations.   Gastrointestinal:  Negative for abdominal pain, constipation, diarrhea, nausea and vomiting.   Genitourinary:  Negative for dysuria, frequency and hematuria.   Musculoskeletal:  Negative for joint pain and myalgias.   Skin:  Negative for rash.   Neurological:  Negative for dizziness, seizures and headaches.         Current Outpatient Medications   Medication Instructions    FLUoxetine 40 mg, Oral, Nightly    lansoprazole (PREVACID) 30 mg, Oral, Daily    prenatal vit no.124/iron/folic (PRENATAL VITAMIN ORAL) No dose, route, or frequency recorded.       No results found  "for: "HGBA1C"  No results found for: "MICALBCREAT"  Lab Results   Component Value Date    LDLCALC 72.8 01/10/2025    CHOL 130 01/10/2025    HDL 50 01/10/2025    TRIG 36 01/10/2025       Lab Results   Component Value Date     01/10/2025    K 3.8 01/10/2025     01/10/2025    CO2 25 01/10/2025    GLU 85 01/10/2025    BUN 13 01/10/2025    CREATININE 0.7 01/10/2025    CALCIUM 8.8 01/10/2025    PROT 7.1 01/10/2025    ALBUMIN 4.1 01/10/2025    BILITOT 0.6 01/10/2025    ALKPHOS 105 01/10/2025    AST 21 01/10/2025    ALT 10 01/10/2025    ANIONGAP 8 01/10/2025    WBC 5.15 01/10/2025    HGB 12.7 01/10/2025    HGB 13.1 07/30/2024    HCT 40.2 01/10/2025    MCV 87 01/10/2025     01/10/2025    TSH 3.912 01/10/2025    HEPCAB Non-reactive 03/28/2023       Lab Results   Component Value Date    FSH 6.93 02/15/2023    ESTRADIOL 55 02/15/2023         Past Medical History:   Diagnosis Date    Anxiety disorder, unspecified     Chronic GERD      Past Surgical History:   Procedure Laterality Date    ESOPHAGOGASTRODUODENOSCOPY N/A 8/12/2022    Procedure: EGD (ESOPHAGOGASTRODUODENOSCOPY);  Surgeon: Naun Gaitan MD;  Location: [facility];  Service: General;  Laterality: N/A;    tempanoplasty Bilateral        Social History     Tobacco Use    Smoking status: Never    Smokeless tobacco: Never   Substance Use Topics    Alcohol use: Yes     Alcohol/week: 2.0 standard drinks of alcohol     Types: 1 Glasses of wine, 1 Cans of beer per week     Comment: social    Drug use: Never       family history includes Breast cancer in her maternal aunt; Colon cancer in her maternal grandfather; Hyperlipidemia in her father.    Vitals:    01/09/25 1531   BP: 102/68   Pulse: 65   Temp: 97.5 °F (36.4 °C)   TempSrc: Oral   SpO2: 97%   Weight: 60.5 kg (133 lb 6.1 oz)   Height: 5' 7" (1.702 m)   PainSc: 0-No pain     Objective:   Physical Exam  Vitals reviewed.   Constitutional:       General: She is not in acute distress.     Appearance: Normal " appearance. She is well-developed.   HENT:      Head: Normocephalic and atraumatic.      Right Ear: External ear normal.      Left Ear: External ear normal.      Nose: Nose normal.      Mouth/Throat:      Mouth: Mucous membranes are moist.   Eyes:      General: No scleral icterus.        Right eye: No discharge.         Left eye: No discharge.      Conjunctiva/sclera: Conjunctivae normal.   Cardiovascular:      Rate and Rhythm: Normal rate and regular rhythm.      Heart sounds: Normal heart sounds. No murmur heard.     No friction rub. No gallop.   Pulmonary:      Effort: Pulmonary effort is normal. No respiratory distress.      Breath sounds: Normal breath sounds. No wheezing, rhonchi or rales.   Musculoskeletal:         General: No deformity.      Right lower leg: No edema.      Left lower leg: No edema.   Skin:     General: Skin is warm and dry.   Neurological:      General: No focal deficit present.      Mental Status: She is alert and oriented to person, place, and time.   Psychiatric:         Mood and Affect: Mood normal.         Behavior: Behavior normal.         Thought Content: Thought content normal.             Renea Motta MD  Internal Medicine and Pediatrics

## 2025-01-10 ENCOUNTER — LAB VISIT (OUTPATIENT)
Dept: LAB | Facility: HOSPITAL | Age: 30
End: 2025-01-10
Attending: STUDENT IN AN ORGANIZED HEALTH CARE EDUCATION/TRAINING PROGRAM
Payer: COMMERCIAL

## 2025-01-10 DIAGNOSIS — Z00.00 ANNUAL PHYSICAL EXAM: ICD-10-CM

## 2025-01-10 LAB
ALBUMIN SERPL BCP-MCNC: 4.1 G/DL (ref 3.5–5.2)
ALP SERPL-CCNC: 105 U/L (ref 40–150)
ALT SERPL W/O P-5'-P-CCNC: 10 U/L (ref 10–44)
ANION GAP SERPL CALC-SCNC: 8 MMOL/L (ref 8–16)
AST SERPL-CCNC: 21 U/L (ref 10–40)
BASOPHILS # BLD AUTO: 0.07 K/UL (ref 0–0.2)
BASOPHILS NFR BLD: 1.4 % (ref 0–1.9)
BILIRUB SERPL-MCNC: 0.6 MG/DL (ref 0.1–1)
BUN SERPL-MCNC: 13 MG/DL (ref 6–20)
CALCIUM SERPL-MCNC: 8.8 MG/DL (ref 8.7–10.5)
CHLORIDE SERPL-SCNC: 107 MMOL/L (ref 95–110)
CHOLEST SERPL-MCNC: 130 MG/DL (ref 120–199)
CHOLEST/HDLC SERPL: 2.6 {RATIO} (ref 2–5)
CO2 SERPL-SCNC: 25 MMOL/L (ref 23–29)
CREAT SERPL-MCNC: 0.7 MG/DL (ref 0.5–1.4)
DIFFERENTIAL METHOD BLD: ABNORMAL
EOSINOPHIL # BLD AUTO: 0.3 K/UL (ref 0–0.5)
EOSINOPHIL NFR BLD: 4.9 % (ref 0–8)
ERYTHROCYTE [DISTWIDTH] IN BLOOD BY AUTOMATED COUNT: 12.4 % (ref 11.5–14.5)
EST. GFR  (NO RACE VARIABLE): >60 ML/MIN/1.73 M^2
GLUCOSE SERPL-MCNC: 85 MG/DL (ref 70–110)
HCT VFR BLD AUTO: 40.2 % (ref 37–48.5)
HDLC SERPL-MCNC: 50 MG/DL (ref 40–75)
HDLC SERPL: 38.5 % (ref 20–50)
HGB BLD-MCNC: 12.7 G/DL (ref 12–16)
IMM GRANULOCYTES # BLD AUTO: 0.04 K/UL (ref 0–0.04)
IMM GRANULOCYTES NFR BLD AUTO: 0.8 % (ref 0–0.5)
LDLC SERPL CALC-MCNC: 72.8 MG/DL (ref 63–159)
LYMPHOCYTES # BLD AUTO: 2.1 K/UL (ref 1–4.8)
LYMPHOCYTES NFR BLD: 41.4 % (ref 18–48)
MCH RBC QN AUTO: 27.6 PG (ref 27–31)
MCHC RBC AUTO-ENTMCNC: 31.6 G/DL (ref 32–36)
MCV RBC AUTO: 87 FL (ref 82–98)
MONOCYTES # BLD AUTO: 0.5 K/UL (ref 0.3–1)
MONOCYTES NFR BLD: 8.7 % (ref 4–15)
NEUTROPHILS # BLD AUTO: 2.2 K/UL (ref 1.8–7.7)
NEUTROPHILS NFR BLD: 42.8 % (ref 38–73)
NONHDLC SERPL-MCNC: 80 MG/DL
NRBC BLD-RTO: 0 /100 WBC
PLATELET # BLD AUTO: 176 K/UL (ref 150–450)
PMV BLD AUTO: 11.1 FL (ref 9.2–12.9)
POTASSIUM SERPL-SCNC: 3.8 MMOL/L (ref 3.5–5.1)
PROT SERPL-MCNC: 7.1 G/DL (ref 6–8.4)
RBC # BLD AUTO: 4.6 M/UL (ref 4–5.4)
SODIUM SERPL-SCNC: 140 MMOL/L (ref 136–145)
TRIGL SERPL-MCNC: 36 MG/DL (ref 30–150)
TSH SERPL DL<=0.005 MIU/L-ACNC: 3.91 UIU/ML (ref 0.4–4)
WBC # BLD AUTO: 5.15 K/UL (ref 3.9–12.7)

## 2025-01-10 PROCEDURE — 36415 COLL VENOUS BLD VENIPUNCTURE: CPT | Performed by: STUDENT IN AN ORGANIZED HEALTH CARE EDUCATION/TRAINING PROGRAM

## 2025-01-10 PROCEDURE — 85025 COMPLETE CBC W/AUTO DIFF WBC: CPT | Performed by: STUDENT IN AN ORGANIZED HEALTH CARE EDUCATION/TRAINING PROGRAM

## 2025-01-10 PROCEDURE — 84443 ASSAY THYROID STIM HORMONE: CPT | Performed by: STUDENT IN AN ORGANIZED HEALTH CARE EDUCATION/TRAINING PROGRAM

## 2025-01-10 PROCEDURE — 80053 COMPREHEN METABOLIC PANEL: CPT | Performed by: STUDENT IN AN ORGANIZED HEALTH CARE EDUCATION/TRAINING PROGRAM

## 2025-01-10 PROCEDURE — 80061 LIPID PANEL: CPT | Performed by: STUDENT IN AN ORGANIZED HEALTH CARE EDUCATION/TRAINING PROGRAM

## 2025-03-12 ENCOUNTER — HOSPITAL ENCOUNTER (EMERGENCY)
Facility: HOSPITAL | Age: 30
Discharge: HOME | End: 2025-03-12
Attending: EMERGENCY MEDICINE
Payer: COMMERCIAL

## 2025-03-12 VITALS
BODY MASS INDEX: 20.4 KG/M2 | WEIGHT: 130 LBS | RESPIRATION RATE: 16 BRPM | DIASTOLIC BLOOD PRESSURE: 58 MMHG | TEMPERATURE: 98 F | OXYGEN SATURATION: 98 % | SYSTOLIC BLOOD PRESSURE: 103 MMHG | HEIGHT: 67 IN | HEART RATE: 72 BPM

## 2025-03-12 DIAGNOSIS — R51.9 NONINTRACTABLE HEADACHE, UNSPECIFIED CHRONICITY PATTERN, UNSPECIFIED HEADACHE TYPE: Primary | ICD-10-CM

## 2025-03-12 LAB
B-HCG UR QL: NEGATIVE
CTP QC/QA: YES

## 2025-03-12 PROCEDURE — 99285 EMERGENCY DEPT VISIT HI MDM: CPT | Mod: 25

## 2025-03-12 PROCEDURE — 63600175 PHARM REV CODE 636 W HCPCS: Performed by: EMERGENCY MEDICINE

## 2025-03-12 PROCEDURE — 96375 TX/PRO/DX INJ NEW DRUG ADDON: CPT

## 2025-03-12 PROCEDURE — 96374 THER/PROPH/DIAG INJ IV PUSH: CPT

## 2025-03-12 PROCEDURE — 81025 URINE PREGNANCY TEST: CPT | Performed by: EMERGENCY MEDICINE

## 2025-03-12 RX ORDER — KETOROLAC TROMETHAMINE 30 MG/ML
10 INJECTION, SOLUTION INTRAMUSCULAR; INTRAVENOUS
Status: COMPLETED | OUTPATIENT
Start: 2025-03-12 | End: 2025-03-12

## 2025-03-12 RX ORDER — PROCHLORPERAZINE EDISYLATE 5 MG/ML
10 INJECTION INTRAMUSCULAR; INTRAVENOUS
Status: COMPLETED | OUTPATIENT
Start: 2025-03-12 | End: 2025-03-12

## 2025-03-12 RX ORDER — DIPHENHYDRAMINE HYDROCHLORIDE 50 MG/ML
25 INJECTION, SOLUTION INTRAMUSCULAR; INTRAVENOUS
Status: COMPLETED | OUTPATIENT
Start: 2025-03-12 | End: 2025-03-12

## 2025-03-12 RX ADMIN — KETOROLAC TROMETHAMINE 10 MG: 30 INJECTION, SOLUTION INTRAMUSCULAR; INTRAVENOUS at 07:03

## 2025-03-12 RX ADMIN — PROCHLORPERAZINE EDISYLATE 10 MG: 5 INJECTION INTRAMUSCULAR; INTRAVENOUS at 07:03

## 2025-03-12 RX ADMIN — DIPHENHYDRAMINE HYDROCHLORIDE 25 MG: 50 INJECTION, SOLUTION INTRAMUSCULAR; INTRAVENOUS at 07:03

## 2025-03-12 NOTE — ED TRIAGE NOTES
Opal Killian, a 30 y.o. female presents to the ED w/ complaint of migraine for the past several days with nausea, history of migraines    Triage note:  Chief Complaint   Patient presents with    Migraine     Pt states migraine for the past few days and states nothing has helped the pain, states history of migraines, endorsing nausea too     Review of patient's allergies indicates:  No Known Allergies  Past Medical History:   Diagnosis Date    Anxiety disorder, unspecified     Chronic GERD

## 2025-03-12 NOTE — ED PROVIDER NOTES
Encounter Date: 3/12/2025       History     Chief Complaint   Patient presents with    Migraine     Pt states migraine for the past few days and states nothing has helped the pain, states history of migraines, endorsing nausea too     30-year-old female, healthy, complaining of a headache since Monday, 2 days ago.  Patient reports that she had headaches as a teenager, was diagnosed with migraines but they have been infrequent since then.  Monday she had the gradual onset of a bifrontal headache.  It persisted yesterday despite taking over-the-counter pain medication.  Overnight at worsened.  It is worse with leaning forward and turning her head side-to-side and she does have some neck pain associated with it.  She has photophobia nausea as well but no fevers, chills, vomiting, confusion.  No recent URI symptoms.    The history is provided by the patient.     Review of patient's allergies indicates:  No Known Allergies  Past Medical History:   Diagnosis Date    Anxiety disorder, unspecified     Chronic GERD      Past Surgical History:   Procedure Laterality Date    ESOPHAGOGASTRODUODENOSCOPY N/A 8/12/2022    Procedure: EGD (ESOPHAGOGASTRODUODENOSCOPY);  Surgeon: Naun Gaitan MD;  Location: [facility];  Service: General;  Laterality: N/A;    tempanoplasty Bilateral      Family History   Problem Relation Name Age of Onset    Hyperlipidemia Father Dakota     Colon cancer Maternal Grandfather Alexandre         over age 50    Breast cancer Maternal Aunt Wendy         over age 50    Ovarian cancer Neg Hx      Uterine cancer Neg Hx      Cervical cancer Neg Hx       Social History[1]  Review of Systems    Physical Exam     Initial Vitals [03/12/25 0539]   BP Pulse Resp Temp SpO2   113/71 82 18 98.2 °F (36.8 °C) 98 %      MAP       --         Physical Exam    Nursing note and vitals reviewed.  Constitutional: Vital signs are normal. She appears well-developed and well-nourished. She is not diaphoretic.  Non-toxic appearance.  She does not appear ill. No distress.   HENT:   Head: Normocephalic and atraumatic. Mouth/Throat: Oropharynx is clear and moist and mucous membranes are normal. Mucous membranes are not dry.   Eyes: Conjunctivae and lids are normal.   Neck: Neck supple.   Normal range of motion.  Cardiovascular:  Normal rate.           Pulmonary/Chest: No respiratory distress.   Musculoskeletal:         General: No edema.      Cervical back: Normal range of motion and neck supple. No rigidity.     Neurological: She is alert and oriented to person, place, and time. She has normal strength. No cranial nerve deficit. GCS score is 15. GCS eye subscore is 4. GCS verbal subscore is 5. GCS motor subscore is 6.   Skin: Skin is dry and intact. No pallor.   Psychiatric: She has a normal mood and affect. Her speech is normal and behavior is normal.         ED Course   Procedures  Labs Reviewed   POCT URINE PREGNANCY       Result Value    POC Preg Test, Ur Negative       Acceptable Yes            Imaging Results              CT Head Without Contrast (Final result)  Result time 03/12/25 07:49:49      Final result by Dwight Pike DO (03/12/25 07:49:49)                   Impression:      No acute intracranial findings as detailed above specifically without evidence for acute intracranial hemorrhage or hydrocephalus.    Further evaluation as warranted clinically.      Electronically signed by: Dwight Pike DO  Date:    03/12/2025  Time:    07:49               Narrative:    EXAMINATION:  CT HEAD WITHOUT CONTRAST    CLINICAL HISTORY:  Headache, new or worsening, neuro deficit (Age 19-49y);    TECHNIQUE:  Multiple sequential 5 mm axial images of the head without contrast.  Coronal and sagittal reformatted imaging from the axial acquisition.    COMPARISON:  None    FINDINGS:  There is no evidence for acute intracranial hemorrhage or sulcal effacement.  The ventricles are normal in size without hydrocephalus.  There is no midline  shift or mass effect.  Patchy opacities ethmoid air cells and mild mucosal thickening visualized maxillary antra..                                       Medications   prochlorperazine injection Soln 10 mg (10 mg Intravenous Given 3/12/25 0713)   diphenhydrAMINE injection 25 mg (25 mg Intravenous Given 3/12/25 0713)   ketorolac injection 9.999 mg (9.999 mg Intravenous Given 3/12/25 0713)     Medical Decision Making  This patient's headache seems benign in nature and is most likely 2/2 to a migraine or tension HA.  I have considered SAH, ICH, meningitis/encephalitis, central venous thrombosis, idiopathic intracranial HTN, temporal arteritis, PRES, pituitary apoplexy, vertebral/carotid artery dissection, brain mass, acute glaucoma and other serious causes of HA.      Patient's vital signs are normal and her neurologic exam is completely normal.  Neck supple and no photophobia..    Patient does have a history of migraines which is reassuring but given that this is an unusual headache type for her, out of an abundance of caution, I did want to get a CT head.    CT head is unremarkable.  On clinical reassessment, patient reports significant improvement in her headache.  She is not on OCPs so risk of cerebral venous thrombosis low.  Had a quality an onset do not seem consistent with a subarachnoid hemorrhage and she has no infectious symptoms whatsoever that would make me think about meningitis.  I do think it is safe for her to be discharged home but was strict ED return precautions.  Patient is comfortable with this plan.    Amount and/or Complexity of Data Reviewed  External Data Reviewed: notes.  Labs: ordered. Decision-making details documented in ED Course.  Radiology: ordered and independent interpretation performed. Decision-making details documented in ED Course.    Risk  Prescription drug management.                                      Clinical Impression:  Final diagnoses:  [R51.9] Nonintractable headache,  unspecified chronicity pattern, unspecified headache type (Primary)          ED Disposition Condition    Discharge Stable          ED Prescriptions    None       Follow-up Information       Follow up With Specialties Details Why Contact Info    [facility] Emergency Medicine  If symptoms worsen [id number]  [facility] [id number] [phone]                 [1]   Social History  Tobacco Use    Smoking status: Never    Smokeless tobacco: Never   Substance Use Topics    Alcohol use: Yes     Alcohol/week: 2.0 standard drinks of alcohol     Types: 1 Glasses of wine, 1 Cans of beer per week     Comment: social    Drug use: Never        Indira Esparza MD  03/12/25 1990

## 2025-04-29 ENCOUNTER — TELEPHONE (OUTPATIENT)
Dept: URGENT CARE | Facility: CLINIC | Age: 30
End: 2025-04-29
Payer: COMMERCIAL

## 2025-07-06 ENCOUNTER — PATIENT MESSAGE (OUTPATIENT)
Dept: OBSTETRICS AND GYNECOLOGY | Facility: CLINIC | Age: 30
End: 2025-07-06
Payer: COMMERCIAL

## 2025-07-07 ENCOUNTER — E-VISIT (OUTPATIENT)
Dept: OBSTETRICS AND GYNECOLOGY | Facility: CLINIC | Age: 30
End: 2025-07-07
Payer: COMMERCIAL

## 2025-07-07 DIAGNOSIS — Z31.89 ENCOUNTER FOR FERTILITY PLANNING: Primary | ICD-10-CM

## 2025-07-08 NOTE — PROGRESS NOTES
Patient ID: Opal Killian is a 30 y.o. female.        E-Visit Time Tracking:             Chief Complaint: General Illness (Entered automatically based on patient selection in [facility] group.)      The patient initiated a request through [facility] group on 7/7/2025 for evaluation and management with a chief complaint of General Illness (Entered automatically based on patient selection in [facility] group.)     I evaluated the questionnaire submission on 07/08/2025.    [facility]    7/7/2025  7:41 PM CDT - Filed by Patient   Do you agree to participate in an E-Visit? Yes   If you have any of the following symptoms, please go to the nearest emergency room or call 911: I acknowledge   Choose the state of your primary residence [place]   Do you have any of the following pregnancy-related conditions? (Pregnant, Possibly pregnant, Breast feeding, None) None   What is the main issue you would like addressed today? Discuss ovulation induction   Please describe your symptoms. Irregular period and very long cycles, have been trying to conceive for 7 months   Where is your problem located? Ovaries/uterus   On a scale of 1-10, where 10 is the worst you can imagine, how severe are your symptoms? (range: 1 - 10) 6   Have you had these symptoms before? Yes   How long have you been having these symptoms? (Just today, For a few days, For a week, For one to four weeks, For more than a month) More than a month   What helps with your symptoms? Nothing   What makes your symptoms feel worse? Nothing   Are these symptoms related to a condition that you currently have? (Yes, No, Not sure) Not sure   Please describe any probable cause for your symptoms. Irregular periods, possible PCOS   Provide any information you feel is important to your history not asked above Cycles lasting between 35-50+ days   Please attach any relevant images or files    Are you able to take your vitals? Yes   Systolic Blood Pressure    Diastolic Blood Pressure     Weight:    Height:    Pluse:    Temp    Resp:    SpO2          No diagnosis found.     No orders of the defined types were placed in this encounter.           No follow-ups on file.

## 2025-07-10 ENCOUNTER — LAB VISIT (OUTPATIENT)
Dept: LAB | Facility: HOSPITAL | Age: 30
End: 2025-07-10
Payer: COMMERCIAL

## 2025-07-10 DIAGNOSIS — Z31.89 ENCOUNTER FOR FERTILITY PLANNING: ICD-10-CM

## 2025-07-10 LAB
PROGEST SERPL-MCNC: 10.5 NG/ML
PROLACTIN SERPL IA-MCNC: 15.2 NG/ML (ref 5.2–26.5)

## 2025-07-10 PROCEDURE — 36415 COLL VENOUS BLD VENIPUNCTURE: CPT | Mod: PO

## 2025-07-10 PROCEDURE — 82166 ASSAY ANTI-MULLERIAN HORM: CPT

## 2025-07-10 PROCEDURE — 84146 ASSAY OF PROLACTIN: CPT

## 2025-07-10 PROCEDURE — 84144 ASSAY OF PROGESTERONE: CPT

## 2025-07-14 LAB — MIS SERPL IA-MCNC: 6.4 NG/ML (ref 0.58–8.1)

## 2025-07-23 ENCOUNTER — HOSPITAL ENCOUNTER (OUTPATIENT)
Dept: RADIOLOGY | Facility: OTHER | Age: 30
Discharge: HOME | End: 2025-07-23
Attending: STUDENT IN AN ORGANIZED HEALTH CARE EDUCATION/TRAINING PROGRAM
Payer: COMMERCIAL

## 2025-07-23 DIAGNOSIS — Z31.89 ENCOUNTER FOR FERTILITY PLANNING: ICD-10-CM

## 2025-07-23 PROCEDURE — 76830 TRANSVAGINAL US NON-OB: CPT | Mod: TC

## 2025-07-23 PROCEDURE — 76830 TRANSVAGINAL US NON-OB: CPT | Mod: 26,,, | Performed by: RADIOLOGY

## 2025-07-23 PROCEDURE — 76856 US EXAM PELVIC COMPLETE: CPT | Mod: 26,,, | Performed by: RADIOLOGY

## 2025-08-05 ENCOUNTER — HOSPITAL ENCOUNTER (OUTPATIENT)
Dept: RADIOLOGY | Facility: OTHER | Age: 30
Discharge: HOME | End: 2025-08-05
Attending: OBSTETRICS & GYNECOLOGY
Payer: COMMERCIAL

## 2025-08-05 DIAGNOSIS — R93.5 ABNORMAL ULTRASOUND OF UTERUS: ICD-10-CM

## 2025-08-05 PROCEDURE — 25500020 PHARM REV CODE 255: Performed by: OBSTETRICS & GYNECOLOGY

## 2025-08-05 PROCEDURE — 72197 MRI PELVIS W/O & W/DYE: CPT | Mod: 26,,, | Performed by: RADIOLOGY

## 2025-08-05 PROCEDURE — A9585 GADOBUTROL INJECTION: HCPCS | Performed by: OBSTETRICS & GYNECOLOGY

## 2025-08-05 PROCEDURE — 72197 MRI PELVIS W/O & W/DYE: CPT | Mod: TC

## 2025-08-05 RX ORDER — GADOBUTROL 604.72 MG/ML
6 INJECTION INTRAVENOUS
Status: COMPLETED | OUTPATIENT
Start: 2025-08-05 | End: 2025-08-05

## 2025-08-05 RX ADMIN — GADOBUTROL 6 ML: 604.72 INJECTION INTRAVENOUS at 03:08

## 2025-09-02 ENCOUNTER — CLINICAL SUPPORT (OUTPATIENT)
Dept: OBSTETRICS AND GYNECOLOGY | Facility: CLINIC | Age: 30
End: 2025-09-02
Payer: COMMERCIAL

## 2025-09-02 DIAGNOSIS — N91.2 AMENORRHEA: Primary | ICD-10-CM

## 2025-09-02 RX ORDER — OMEPRAZOLE 40 MG/1
40 CAPSULE, DELAYED RELEASE ORAL DAILY
COMMUNITY